# Patient Record
Sex: MALE | Race: WHITE | Employment: FULL TIME | URBAN - METROPOLITAN AREA
[De-identification: names, ages, dates, MRNs, and addresses within clinical notes are randomized per-mention and may not be internally consistent; named-entity substitution may affect disease eponyms.]

---

## 2017-01-04 ENCOUNTER — TELEPHONE (OUTPATIENT)
Dept: BARIATRICS/WEIGHT MGMT | Age: 32
End: 2017-01-04

## 2017-01-04 ENCOUNTER — TELEPHONE (OUTPATIENT)
Dept: FAMILY MEDICINE CLINIC | Age: 32
End: 2017-01-04

## 2017-01-05 ENCOUNTER — OFFICE VISIT (OUTPATIENT)
Dept: BARIATRICS/WEIGHT MGMT | Age: 32
End: 2017-01-05

## 2017-01-05 DIAGNOSIS — E66.01 MORBID OBESITY WITH BMI OF 40.0-44.9, ADULT (HCC): Primary | ICD-10-CM

## 2017-01-05 DIAGNOSIS — I10 HYPERTENSION, ESSENTIAL: ICD-10-CM

## 2017-01-05 PROBLEM — R06.83 SNORING: Status: ACTIVE | Noted: 2017-01-05

## 2017-01-05 PROCEDURE — 99244 OFF/OP CNSLTJ NEW/EST MOD 40: CPT | Performed by: SURGERY

## 2017-01-06 ENCOUNTER — OFFICE VISIT (OUTPATIENT)
Dept: FAMILY MEDICINE CLINIC | Age: 32
End: 2017-01-06

## 2017-01-06 VITALS
BODY MASS INDEX: 40.43 KG/M2 | SYSTOLIC BLOOD PRESSURE: 136 MMHG | DIASTOLIC BLOOD PRESSURE: 86 MMHG | HEART RATE: 75 BPM | RESPIRATION RATE: 16 BRPM | WEIGHT: 315 LBS | OXYGEN SATURATION: 98 % | HEIGHT: 74 IN

## 2017-01-06 DIAGNOSIS — I10 HYPERTENSION, ESSENTIAL: ICD-10-CM

## 2017-01-06 DIAGNOSIS — H69.83 EUSTACHIAN TUBE DYSFUNCTION, BILATERAL: Primary | ICD-10-CM

## 2017-01-06 PROCEDURE — 99213 OFFICE O/P EST LOW 20 MIN: CPT | Performed by: NURSE PRACTITIONER

## 2017-01-06 RX ORDER — FLUNISOLIDE 0.25 MG/ML
1 SOLUTION NASAL EVERY 12 HOURS
Qty: 25 ML | Refills: 0 | Status: SHIPPED | OUTPATIENT
Start: 2017-01-06 | End: 2019-01-03 | Stop reason: ALTCHOICE

## 2017-01-06 ASSESSMENT — PATIENT HEALTH QUESTIONNAIRE - PHQ9
1. LITTLE INTEREST OR PLEASURE IN DOING THINGS: 0
SUM OF ALL RESPONSES TO PHQ QUESTIONS 1-9: 0
SUM OF ALL RESPONSES TO PHQ9 QUESTIONS 1 & 2: 0
2. FEELING DOWN, DEPRESSED OR HOPELESS: 0

## 2017-01-06 ASSESSMENT — ENCOUNTER SYMPTOMS
BACK PAIN: 0
COUGH: 0
VOMITING: 0
NAUSEA: 0
CHEST TIGHTNESS: 0

## 2017-02-09 ENCOUNTER — OFFICE VISIT (OUTPATIENT)
Dept: BARIATRICS/WEIGHT MGMT | Age: 32
End: 2017-02-09

## 2017-02-09 ENCOUNTER — HOSPITAL ENCOUNTER (OUTPATIENT)
Dept: ULTRASOUND IMAGING | Age: 32
Discharge: HOME | End: 2017-02-09
Attending: SURGERY | Admitting: SURGERY

## 2017-02-09 VITALS
HEART RATE: 100 BPM | BODY MASS INDEX: 44.1 KG/M2 | RESPIRATION RATE: 16 BRPM | HEIGHT: 71 IN | DIASTOLIC BLOOD PRESSURE: 84 MMHG | WEIGHT: 315 LBS | SYSTOLIC BLOOD PRESSURE: 121 MMHG

## 2017-02-09 VITALS
BODY MASS INDEX: 44.1 KG/M2 | HEART RATE: 98 BPM | WEIGHT: 315 LBS | SYSTOLIC BLOOD PRESSURE: 135 MMHG | DIASTOLIC BLOOD PRESSURE: 89 MMHG | HEIGHT: 71 IN

## 2017-02-09 DIAGNOSIS — R06.83 SNORING: ICD-10-CM

## 2017-02-09 DIAGNOSIS — E66.01 MORBID OBESITY WITH BMI OF 40.0-44.9, ADULT (HCC): ICD-10-CM

## 2017-02-09 DIAGNOSIS — K76.0 FATTY LIVER: ICD-10-CM

## 2017-02-09 DIAGNOSIS — I10 HYPERTENSION, ESSENTIAL: ICD-10-CM

## 2017-02-09 DIAGNOSIS — F17.200 TOBACCO DEPENDENCE: ICD-10-CM

## 2017-02-09 DIAGNOSIS — E66.01 MORBID OBESITY WITH BMI OF 45.0-49.9, ADULT (HCC): Primary | ICD-10-CM

## 2017-02-09 DIAGNOSIS — E66.01 MORBID (SEVERE) OBESITY DUE TO EXCESS CALORIES (HCC): ICD-10-CM

## 2017-02-09 LAB
A/G RATIO: 1.4 (ref 1.1–2.2)
ALBUMIN SERPL-MCNC: 4.2 G/DL (ref 3.4–5)
ALP BLD-CCNC: 54 U/L (ref 40–129)
ALT SERPL-CCNC: 30 U/L (ref 10–40)
ANION GAP SERPL CALCULATED.3IONS-SCNC: 15 MMOL/L (ref 3–16)
AST SERPL-CCNC: 20 U/L (ref 15–37)
BASOPHILS ABSOLUTE: 0.1 K/UL (ref 0–0.2)
BASOPHILS RELATIVE PERCENT: 0.9 %
BILIRUB SERPL-MCNC: 0.7 MG/DL (ref 0–1)
BUN BLDV-MCNC: 17 MG/DL (ref 7–20)
CALCIUM SERPL-MCNC: 9.8 MG/DL (ref 8.3–10.6)
CHLORIDE BLD-SCNC: 100 MMOL/L (ref 99–110)
CHOLESTEROL, TOTAL: 190 MG/DL (ref 0–199)
CO2: 26 MMOL/L (ref 21–32)
CREAT SERPL-MCNC: 0.8 MG/DL (ref 0.9–1.3)
EOSINOPHILS ABSOLUTE: 0.2 K/UL (ref 0–0.6)
EOSINOPHILS RELATIVE PERCENT: 2.2 %
FOLATE: 6 NG/ML (ref 4.78–24.2)
GFR AFRICAN AMERICAN: >60
GFR NON-AFRICAN AMERICAN: >60
GLOBULIN: 3.1 G/DL
GLUCOSE BLD-MCNC: 113 MG/DL (ref 70–99)
HCT VFR BLD CALC: 46.7 % (ref 40.5–52.5)
HDLC SERPL-MCNC: 34 MG/DL (ref 40–60)
HEMOGLOBIN: 15.7 G/DL (ref 13.5–17.5)
IRON SATURATION: 25 % (ref 20–50)
IRON: 80 UG/DL (ref 59–158)
LDL CHOLESTEROL CALCULATED: ABNORMAL MG/DL
LDL CHOLESTEROL DIRECT: 103 MG/DL
LYMPHOCYTES ABSOLUTE: 2 K/UL (ref 1–5.1)
LYMPHOCYTES RELATIVE PERCENT: 24.5 %
MCH RBC QN AUTO: 28.9 PG (ref 26–34)
MCHC RBC AUTO-ENTMCNC: 33.7 G/DL (ref 31–36)
MCV RBC AUTO: 85.8 FL (ref 80–100)
MONOCYTES ABSOLUTE: 0.7 K/UL (ref 0–1.3)
MONOCYTES RELATIVE PERCENT: 9.1 %
NEUTROPHILS ABSOLUTE: 5.1 K/UL (ref 1.7–7.7)
NEUTROPHILS RELATIVE PERCENT: 63.3 %
PDW BLD-RTO: 13.5 % (ref 12.4–15.4)
PLATELET # BLD: 220 K/UL (ref 135–450)
PMV BLD AUTO: 8 FL (ref 5–10.5)
POTASSIUM SERPL-SCNC: 4.1 MMOL/L (ref 3.5–5.1)
RBC # BLD: 5.44 M/UL (ref 4.2–5.9)
SODIUM BLD-SCNC: 141 MMOL/L (ref 136–145)
TOTAL IRON BINDING CAPACITY: 323 UG/DL (ref 260–445)
TOTAL PROTEIN: 7.3 G/DL (ref 6.4–8.2)
TRIGL SERPL-MCNC: 433 MG/DL (ref 0–150)
TSH REFLEX: 1.68 UIU/ML (ref 0.27–4.2)
VITAMIN B-12: 398 PG/ML (ref 211–911)
VITAMIN D 25-HYDROXY: 17.4 NG/ML
VLDLC SERPL CALC-MCNC: ABNORMAL MG/DL
WBC # BLD: 8.1 K/UL (ref 4–11)

## 2017-02-09 PROCEDURE — 99213 OFFICE O/P EST LOW 20 MIN: CPT | Performed by: SURGERY

## 2017-02-09 ASSESSMENT — ENCOUNTER SYMPTOMS
RESPIRATORY NEGATIVE: 1
GASTROINTESTINAL NEGATIVE: 1

## 2017-02-10 ENCOUNTER — TELEPHONE (OUTPATIENT)
Dept: BARIATRICS/WEIGHT MGMT | Age: 32
End: 2017-02-10

## 2017-02-10 DIAGNOSIS — E78.2 MIXED HYPERLIPIDEMIA: ICD-10-CM

## 2017-02-10 DIAGNOSIS — E55.9 VITAMIN D DEFICIENCY: ICD-10-CM

## 2017-02-10 DIAGNOSIS — R73.01 IMPAIRED FASTING GLUCOSE: Primary | ICD-10-CM

## 2017-02-10 LAB
ESTIMATED AVERAGE GLUCOSE: 111.2 MG/DL
HBA1C MFR BLD: 5.5 %

## 2017-02-10 RX ORDER — ERGOCALCIFEROL 1.25 MG/1
50000 CAPSULE ORAL WEEKLY
Qty: 4 CAPSULE | Refills: 2 | Status: SHIPPED | OUTPATIENT
Start: 2017-02-10 | End: 2017-08-10 | Stop reason: ALTCHOICE

## 2017-02-11 LAB — H PYLORI ANTIGEN STOOL: NEGATIVE

## 2017-03-09 ENCOUNTER — OFFICE VISIT (OUTPATIENT)
Dept: BARIATRICS/WEIGHT MGMT | Age: 32
End: 2017-03-09

## 2017-03-09 VITALS
HEIGHT: 71 IN | HEART RATE: 100 BPM | SYSTOLIC BLOOD PRESSURE: 128 MMHG | DIASTOLIC BLOOD PRESSURE: 89 MMHG | WEIGHT: 315 LBS | BODY MASS INDEX: 44.1 KG/M2

## 2017-03-09 DIAGNOSIS — E78.2 MIXED HYPERLIPIDEMIA: ICD-10-CM

## 2017-03-09 DIAGNOSIS — K76.0 FATTY LIVER: ICD-10-CM

## 2017-03-09 DIAGNOSIS — I10 HYPERTENSION, ESSENTIAL: ICD-10-CM

## 2017-03-09 DIAGNOSIS — E66.01 MORBID OBESITY WITH BMI OF 45.0-49.9, ADULT (HCC): Primary | ICD-10-CM

## 2017-03-09 PROCEDURE — 99213 OFFICE O/P EST LOW 20 MIN: CPT | Performed by: SURGERY

## 2017-04-07 ENCOUNTER — TELEPHONE (OUTPATIENT)
Dept: BARIATRICS/WEIGHT MGMT | Age: 32
End: 2017-04-07

## 2017-04-11 ENCOUNTER — INITIAL CONSULT (OUTPATIENT)
Dept: BARIATRICS/WEIGHT MGMT | Age: 32
End: 2017-04-11

## 2017-04-11 DIAGNOSIS — E66.01 MORBID OBESITY WITH BMI OF 45.0-49.9, ADULT (HCC): ICD-10-CM

## 2017-04-11 DIAGNOSIS — F43.21 ADJUSTMENT DISORDER WITH DEPRESSED MOOD: Primary | ICD-10-CM

## 2017-04-11 PROCEDURE — 96101 PR PSYCHOLOGIC TESTING BY PSYCH/PHYS: CPT | Performed by: PSYCHOLOGIST

## 2017-04-11 PROCEDURE — 90791 PSYCH DIAGNOSTIC EVALUATION: CPT | Performed by: PSYCHOLOGIST

## 2017-04-13 ENCOUNTER — OFFICE VISIT (OUTPATIENT)
Dept: BARIATRICS/WEIGHT MGMT | Age: 32
End: 2017-04-13

## 2017-04-13 VITALS
BODY MASS INDEX: 44.1 KG/M2 | DIASTOLIC BLOOD PRESSURE: 99 MMHG | SYSTOLIC BLOOD PRESSURE: 145 MMHG | HEIGHT: 71 IN | WEIGHT: 315 LBS | HEART RATE: 79 BPM

## 2017-04-13 DIAGNOSIS — I10 HYPERTENSION, ESSENTIAL: ICD-10-CM

## 2017-04-13 DIAGNOSIS — E66.01 MORBID OBESITY WITH BMI OF 45.0-49.9, ADULT (HCC): Primary | ICD-10-CM

## 2017-04-13 DIAGNOSIS — E78.2 MIXED HYPERLIPIDEMIA: ICD-10-CM

## 2017-04-13 DIAGNOSIS — K76.0 FATTY LIVER: ICD-10-CM

## 2017-04-13 PROCEDURE — 99213 OFFICE O/P EST LOW 20 MIN: CPT | Performed by: SURGERY

## 2017-05-11 ENCOUNTER — OFFICE VISIT (OUTPATIENT)
Dept: BARIATRICS/WEIGHT MGMT | Age: 32
End: 2017-05-11

## 2017-05-11 VITALS — WEIGHT: 315 LBS | RESPIRATION RATE: 16 BRPM | HEIGHT: 71 IN | BODY MASS INDEX: 44.1 KG/M2

## 2017-05-11 DIAGNOSIS — I10 HYPERTENSION, ESSENTIAL: ICD-10-CM

## 2017-05-11 DIAGNOSIS — E78.2 MIXED HYPERLIPIDEMIA: ICD-10-CM

## 2017-05-11 DIAGNOSIS — K76.0 FATTY LIVER: ICD-10-CM

## 2017-05-11 DIAGNOSIS — E66.01 MORBID OBESITY WITH BMI OF 45.0-49.9, ADULT (HCC): Primary | ICD-10-CM

## 2017-05-11 PROCEDURE — 99214 OFFICE O/P EST MOD 30 MIN: CPT | Performed by: SURGERY

## 2017-06-26 ENCOUNTER — TELEPHONE (OUTPATIENT)
Dept: BARIATRICS/WEIGHT MGMT | Age: 32
End: 2017-06-26

## 2017-08-10 ENCOUNTER — OFFICE VISIT (OUTPATIENT)
Dept: FAMILY MEDICINE CLINIC | Age: 32
End: 2017-08-10

## 2017-08-10 VITALS
DIASTOLIC BLOOD PRESSURE: 90 MMHG | BODY MASS INDEX: 44.1 KG/M2 | HEART RATE: 90 BPM | WEIGHT: 315 LBS | SYSTOLIC BLOOD PRESSURE: 135 MMHG | HEIGHT: 71 IN | TEMPERATURE: 97.8 F | OXYGEN SATURATION: 98 %

## 2017-08-10 DIAGNOSIS — J20.9 BRONCHITIS, ACUTE, WITH BRONCHOSPASM: Primary | ICD-10-CM

## 2017-08-10 DIAGNOSIS — H69.80 ETD (EUSTACHIAN TUBE DYSFUNCTION), UNSPECIFIED LATERALITY: ICD-10-CM

## 2017-08-10 PROCEDURE — 99213 OFFICE O/P EST LOW 20 MIN: CPT | Performed by: FAMILY MEDICINE

## 2017-08-10 RX ORDER — METHYLPREDNISOLONE 4 MG/1
TABLET ORAL
Qty: 21 TABLET | Refills: 0 | Status: SHIPPED | OUTPATIENT
Start: 2017-08-10 | End: 2017-08-16

## 2017-08-10 RX ORDER — AZITHROMYCIN 250 MG/1
TABLET, FILM COATED ORAL
Qty: 6 TABLET | Refills: 0 | Status: SHIPPED | OUTPATIENT
Start: 2017-08-10 | End: 2017-08-20

## 2017-08-10 ASSESSMENT — ENCOUNTER SYMPTOMS
COUGH: 1
GASTROINTESTINAL NEGATIVE: 1
SINUS PRESSURE: 1
CHEST TIGHTNESS: 1

## 2017-08-29 ENCOUNTER — OFFICE VISIT (OUTPATIENT)
Dept: FAMILY MEDICINE CLINIC | Age: 32
End: 2017-08-29

## 2017-08-29 VITALS
OXYGEN SATURATION: 97 % | DIASTOLIC BLOOD PRESSURE: 80 MMHG | WEIGHT: 315 LBS | HEIGHT: 71 IN | HEART RATE: 85 BPM | BODY MASS INDEX: 44.1 KG/M2 | SYSTOLIC BLOOD PRESSURE: 120 MMHG

## 2017-08-29 DIAGNOSIS — S69.91XA FINGERNAIL INJURY, RIGHT, INITIAL ENCOUNTER: Primary | ICD-10-CM

## 2017-08-29 DIAGNOSIS — S60.949A: ICD-10-CM

## 2017-08-29 DIAGNOSIS — L08.9: ICD-10-CM

## 2017-08-29 DIAGNOSIS — Z23 NEED FOR INFLUENZA VACCINATION: ICD-10-CM

## 2017-08-29 PROCEDURE — 99213 OFFICE O/P EST LOW 20 MIN: CPT | Performed by: FAMILY MEDICINE

## 2017-08-29 PROCEDURE — 90471 IMMUNIZATION ADMIN: CPT | Performed by: FAMILY MEDICINE

## 2017-08-29 PROCEDURE — 90686 IIV4 VACC NO PRSV 0.5 ML IM: CPT | Performed by: FAMILY MEDICINE

## 2017-08-29 RX ORDER — CIPROFLOXACIN 500 MG/1
500 TABLET, FILM COATED ORAL 2 TIMES DAILY
Qty: 28 TABLET | Refills: 0 | Status: SHIPPED | OUTPATIENT
Start: 2017-08-29 | End: 2017-09-12

## 2018-01-10 ENCOUNTER — OFFICE VISIT (OUTPATIENT)
Dept: FAMILY MEDICINE CLINIC | Age: 33
End: 2018-01-10

## 2018-01-10 VITALS
OXYGEN SATURATION: 98 % | SYSTOLIC BLOOD PRESSURE: 150 MMHG | DIASTOLIC BLOOD PRESSURE: 86 MMHG | BODY MASS INDEX: 48.54 KG/M2 | WEIGHT: 315 LBS | TEMPERATURE: 98.6 F | HEART RATE: 99 BPM

## 2018-01-10 DIAGNOSIS — J45.21 MILD INTERMITTENT REACTIVE AIRWAY DISEASE WITH ACUTE EXACERBATION: ICD-10-CM

## 2018-01-10 DIAGNOSIS — I10 ESSENTIAL HYPERTENSION: ICD-10-CM

## 2018-01-10 DIAGNOSIS — J40 BRONCHITIS: Primary | ICD-10-CM

## 2018-01-10 PROCEDURE — 99214 OFFICE O/P EST MOD 30 MIN: CPT | Performed by: FAMILY MEDICINE

## 2018-01-10 RX ORDER — ALBUTEROL SULFATE 90 UG/1
2 AEROSOL, METERED RESPIRATORY (INHALATION) EVERY 6 HOURS PRN
Qty: 1 INHALER | Refills: 3 | Status: SHIPPED | OUTPATIENT
Start: 2018-01-10 | End: 2019-01-03 | Stop reason: ALTCHOICE

## 2018-01-10 RX ORDER — PREDNISONE 20 MG/1
TABLET ORAL
Qty: 10 TABLET | Refills: 0 | Status: SHIPPED | OUTPATIENT
Start: 2018-01-10 | End: 2018-01-20

## 2018-01-10 RX ORDER — DOXYCYCLINE HYCLATE 100 MG
100 TABLET ORAL 2 TIMES DAILY
Qty: 20 TABLET | Refills: 0 | Status: SHIPPED | OUTPATIENT
Start: 2018-01-10 | End: 2018-01-20

## 2018-01-16 ENCOUNTER — TELEPHONE (OUTPATIENT)
Dept: FAMILY MEDICINE CLINIC | Age: 33
End: 2018-01-16

## 2018-01-16 RX ORDER — METHYLPREDNISOLONE 4 MG/1
TABLET ORAL
Qty: 1 KIT | Refills: 0 | Status: SHIPPED | OUTPATIENT
Start: 2018-01-16 | End: 2019-01-03 | Stop reason: ALTCHOICE

## 2018-01-16 NOTE — TELEPHONE ENCOUNTER
Pt was given AB, steroids, and inhaler on 01/10/2018 by Dr. Jose Beltran. Pt states he has seen some improvement, maybe 50%. Thinks he should have seen more improvement by now. Going on vacation in 3 days, going snowmobiling in [facility]). Worried about the weather with being sick.

## 2018-05-02 ENCOUNTER — TELEPHONE (OUTPATIENT)
Dept: FAMILY MEDICINE CLINIC | Age: 33
End: 2018-05-02

## 2018-05-02 RX ORDER — CYCLOBENZAPRINE HCL 10 MG
10 TABLET ORAL 3 TIMES DAILY PRN
Qty: 30 TABLET | Refills: 0 | Status: SHIPPED | OUTPATIENT
Start: 2018-05-02 | End: 2018-05-12

## 2018-05-02 RX ORDER — NAPROXEN 500 MG/1
500 TABLET ORAL 2 TIMES DAILY WITH MEALS
Qty: 20 TABLET | Refills: 0 | Status: SHIPPED | OUTPATIENT
Start: 2018-05-02 | End: 2019-01-03 | Stop reason: ALTCHOICE

## 2018-11-14 ENCOUNTER — TELEPHONE (OUTPATIENT)
Dept: BARIATRICS/WEIGHT MGMT | Age: 33
End: 2018-11-14

## 2018-12-06 ENCOUNTER — TELEPHONE (OUTPATIENT)
Dept: BARIATRICS/WEIGHT MGMT | Age: 33
End: 2018-12-06

## 2018-12-14 RX ORDER — VARENICLINE TARTRATE 25 MG
KIT ORAL
Qty: 1 EACH | Refills: 0 | Status: SHIPPED | OUTPATIENT
Start: 2018-12-14 | End: 2020-07-04

## 2018-12-20 ENCOUNTER — TELEPHONE (OUTPATIENT)
Dept: BARIATRICS/WEIGHT MGMT | Age: 33
End: 2018-12-20

## 2018-12-20 ENCOUNTER — OFFICE VISIT (OUTPATIENT)
Dept: BARIATRICS/WEIGHT MGMT | Age: 33
End: 2018-12-20
Payer: COMMERCIAL

## 2018-12-20 VITALS
BODY MASS INDEX: 44.1 KG/M2 | DIASTOLIC BLOOD PRESSURE: 80 MMHG | SYSTOLIC BLOOD PRESSURE: 140 MMHG | WEIGHT: 315 LBS | HEART RATE: 89 BPM | HEIGHT: 71 IN

## 2018-12-20 DIAGNOSIS — R73.01 IMPAIRED FASTING GLUCOSE: ICD-10-CM

## 2018-12-20 DIAGNOSIS — Z01.818 PRE-OPERATIVE CLEARANCE: ICD-10-CM

## 2018-12-20 DIAGNOSIS — E66.01 MORBID OBESITY WITH BMI OF 45.0-49.9, ADULT (HCC): Primary | ICD-10-CM

## 2018-12-20 DIAGNOSIS — K76.0 FATTY LIVER: ICD-10-CM

## 2018-12-20 DIAGNOSIS — R06.83 SNORING: ICD-10-CM

## 2018-12-20 PROCEDURE — 99214 OFFICE O/P EST MOD 30 MIN: CPT | Performed by: SURGERY

## 2018-12-20 NOTE — PROGRESS NOTES
11.25\" (1.81 m)        Body mass index is 47.92 kg/m².     Lab Results   Component Value Date    WBC 8.1 02/09/2017    RBC 5.44 02/09/2017    HGB 15.7 02/09/2017    HCT 46.7 02/09/2017    MCV 85.8 02/09/2017    MCH 28.9 02/09/2017    MCHC 33.7 02/09/2017    MPV 8.0 02/09/2017    NEUTOPHILPCT 63.3 02/09/2017    LYMPHOPCT 24.5 02/09/2017    MONOPCT 9.1 02/09/2017    EOSRELPCT 2.2 02/09/2017    BASOPCT 0.9 02/09/2017    NEUTROABS 5.1 02/09/2017    LYMPHSABS 2.0 02/09/2017    MONOSABS 0.7 02/09/2017    EOSABS 0.2 02/09/2017     Lab Results   Component Value Date     02/09/2017    K 4.1 02/09/2017     02/09/2017    CO2 26 02/09/2017    ANIONGAP 15 02/09/2017    GLUCOSE 113 02/09/2017    BUN 17 02/09/2017    CREATININE 0.8 02/09/2017    LABGLOM >60 02/09/2017    GFRAA >60 02/09/2017    GFRAA >60 01/07/2011    CALCIUM 9.8 02/09/2017    PROT 7.3 02/09/2017    PROT 7.4 01/07/2011    LABALBU 4.2 02/09/2017    AGRATIO 1.4 02/09/2017    BILITOT 0.7 02/09/2017    ALKPHOS 54 02/09/2017    ALT 30 02/09/2017    AST 20 02/09/2017    GLOB 3.1 02/09/2017     Lab Results   Component Value Date    CHOL 190 02/09/2017    TRIG 433 02/09/2017    HDL 34 02/09/2017    HDL 33 01/07/2011    LDLCALC see below 02/09/2017    LABVLDL see below 02/09/2017     Lab Results   Component Value Date    TSHREFLEX 1.68 02/09/2017     Lab Results   Component Value Date    IRON 80 02/09/2017    TIBC 323 02/09/2017    LABIRON 25 02/09/2017     Lab Results   Component Value Date    KRAMXDYP88 398 02/09/2017    FOLATE 6.00 02/09/2017     Lab Results   Component Value Date    VITD25 17.4 02/09/2017     Lab Results   Component Value Date    LABA1C 5.5 02/09/2017    .2 02/09/2017         Current Outpatient Prescriptions:     varenicline (CHANTIX STARTING MONTH PAK) 0.5 MG X 11 & 1 MG X 42 tablet, Take by mouth as directed, Disp: 1 each, Rfl: 0    naproxen (NAPROSYN) 500 MG tablet, Take 1 tablet by mouth 2 times daily (with meals) for 10 days, has not been here for 19 months, I had advised him last time he was seen that he need to show more committment to the process and more importantly to his health by starting to make healthy lifestyle changes. Since then he has been , has been eating at home more, feels his life is more organized now and can focus on his health. I advised him that we gave him last chance to pursue surgery , but he has to show us that he is really committed and compliant with our recommendations , as this will lead to safe,  successful and sustainable weight loss. I did explain thoroughly to the patient that compliance with pre- and post op diet and other recommendations are integral part to improve the chances of successful weight loss and also not following it could end with serious health complications. Some strategies discussed today like 30/30/30 minutes rule, food diary, avoid fast food and packing/planing ahead,  increasing exercise. ..etc.        Labs ordered. Psych Evaluation. Sleep Study & CPAP Compliance. H-pylori (testing for bacteria in the stomach). Support Group. PCP Letter. Quit Smoking,  Alcohol, Caffeine and Carbonated Drinks  Weight History 2 yrs. F/U in 4 weeks. Patient advised that its their responsibility to follow up for studies, referrals and/or labs ordered today.

## 2018-12-20 NOTE — PROGRESS NOTES
Porfirio Whittington gained 16.3 lbs over 19 months. Pt is still skipping meals. Grazes through the day, works from home. Breakfast: none    Snack: deli turkey OR a few spoons of chicken / tuna salad    Lunch: a few sausage links     Snack: a few slices of turkey and cheese     Dinner: beef stew and crackers and veggies OR mata pasta with sauce and meat OR salmon and broccoli    Snack: poptarts and glass of milk    Fluids: coffee with hazelnut creamer, water, soda 8 per week, milk 1%, powerade zero     Is pt consuming smaller portions? no - has not actively trying to eat smaller portions    Is pt consuming at least 64 oz of fluids per day? no    Is pt consuming carbonated, caffeinated, or sugary beverages? yes coffee and soda 50 oz coffee a day and 10 oz creamer     Has pt sampled Unjury and/or Nectar protein? 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

## 2019-01-03 ENCOUNTER — OFFICE VISIT (OUTPATIENT)
Dept: FAMILY MEDICINE CLINIC | Age: 34
End: 2019-01-03
Payer: COMMERCIAL

## 2019-01-03 VITALS
BODY MASS INDEX: 44.1 KG/M2 | OXYGEN SATURATION: 96 % | WEIGHT: 315 LBS | HEIGHT: 71 IN | DIASTOLIC BLOOD PRESSURE: 80 MMHG | HEART RATE: 85 BPM | SYSTOLIC BLOOD PRESSURE: 126 MMHG

## 2019-01-03 DIAGNOSIS — F17.210 CIGARETTE NICOTINE DEPENDENCE WITHOUT COMPLICATION: Primary | ICD-10-CM

## 2019-01-03 PROCEDURE — 99213 OFFICE O/P EST LOW 20 MIN: CPT | Performed by: FAMILY MEDICINE

## 2019-01-03 RX ORDER — VARENICLINE TARTRATE 1 MG/1
1 TABLET, FILM COATED ORAL 2 TIMES DAILY
Qty: 60 TABLET | Refills: 1 | Status: SHIPPED | OUTPATIENT
Start: 2019-01-14 | End: 2019-03-15

## 2019-01-03 RX ORDER — VARENICLINE TARTRATE 1 MG/1
1 TABLET, FILM COATED ORAL 2 TIMES DAILY
Qty: 60 TABLET | Refills: 3 | Status: CANCELLED | OUTPATIENT
Start: 2019-01-03

## 2019-01-03 ASSESSMENT — PATIENT HEALTH QUESTIONNAIRE - PHQ9
SUM OF ALL RESPONSES TO PHQ QUESTIONS 1-9: 0
SUM OF ALL RESPONSES TO PHQ QUESTIONS 1-9: 0
1. LITTLE INTEREST OR PLEASURE IN DOING THINGS: 0
SUM OF ALL RESPONSES TO PHQ9 QUESTIONS 1 & 2: 0
2. FEELING DOWN, DEPRESSED OR HOPELESS: 0

## 2019-01-04 ASSESSMENT — ENCOUNTER SYMPTOMS
SHORTNESS OF BREATH: 0
COUGH: 0

## 2019-01-17 ENCOUNTER — OFFICE VISIT (OUTPATIENT)
Dept: BARIATRICS/WEIGHT MGMT | Age: 34
End: 2019-01-17
Payer: COMMERCIAL

## 2019-01-17 VITALS
RESPIRATION RATE: 18 BRPM | BODY MASS INDEX: 44.1 KG/M2 | HEIGHT: 71 IN | OXYGEN SATURATION: 98 % | SYSTOLIC BLOOD PRESSURE: 134 MMHG | WEIGHT: 315 LBS | DIASTOLIC BLOOD PRESSURE: 94 MMHG | HEART RATE: 92 BPM

## 2019-01-17 DIAGNOSIS — E66.01 MORBID OBESITY WITH BMI OF 45.0-49.9, ADULT (HCC): Primary | ICD-10-CM

## 2019-01-17 DIAGNOSIS — K76.0 FATTY LIVER: ICD-10-CM

## 2019-01-17 PROCEDURE — 99214 OFFICE O/P EST MOD 30 MIN: CPT | Performed by: SURGERY

## 2019-01-19 PROBLEM — Z01.818 PRE-OPERATIVE CLEARANCE: Status: RESOLVED | Noted: 2018-12-20 | Resolved: 2019-01-19

## 2019-02-04 ENCOUNTER — OFFICE VISIT (OUTPATIENT)
Dept: PULMONOLOGY | Age: 34
End: 2019-02-04
Payer: COMMERCIAL

## 2019-02-04 VITALS
DIASTOLIC BLOOD PRESSURE: 84 MMHG | TEMPERATURE: 97.4 F | BODY MASS INDEX: 44.1 KG/M2 | HEART RATE: 84 BPM | SYSTOLIC BLOOD PRESSURE: 138 MMHG | OXYGEN SATURATION: 96 % | WEIGHT: 315 LBS | HEIGHT: 71 IN | RESPIRATION RATE: 16 BRPM

## 2019-02-04 DIAGNOSIS — R06.83 SNORING: ICD-10-CM

## 2019-02-04 DIAGNOSIS — Z01.818 PRE-OPERATIVE CLEARANCE: ICD-10-CM

## 2019-02-04 DIAGNOSIS — E66.01 MORBID OBESITY (HCC): ICD-10-CM

## 2019-02-04 DIAGNOSIS — Z87.891 PERSONAL HISTORY OF TOBACCO USE: ICD-10-CM

## 2019-02-04 DIAGNOSIS — R53.83 FATIGUE, UNSPECIFIED TYPE: ICD-10-CM

## 2019-02-04 DIAGNOSIS — G47.10 HYPERSOMNIA: Primary | ICD-10-CM

## 2019-02-04 PROCEDURE — 99244 OFF/OP CNSLTJ NEW/EST MOD 40: CPT | Performed by: INTERNAL MEDICINE

## 2019-02-04 RX ORDER — ALBUTEROL SULFATE 90 UG/1
2 AEROSOL, METERED RESPIRATORY (INHALATION) EVERY 6 HOURS PRN
Qty: 1 INHALER | Refills: 6 | Status: SHIPPED | OUTPATIENT
Start: 2019-02-04 | End: 2020-07-04 | Stop reason: ALTCHOICE

## 2019-02-04 ASSESSMENT — SLEEP AND FATIGUE QUESTIONNAIRES
HOW LIKELY ARE YOU TO NOD OFF OR FALL ASLEEP WHEN YOU ARE A PASSENGER IN A CAR FOR AN HOUR WITHOUT A BREAK: 2
HOW LIKELY ARE YOU TO NOD OFF OR FALL ASLEEP IN A CAR, WHILE STOPPED FOR A FEW MINUTES IN TRAFFIC: 0
HOW LIKELY ARE YOU TO NOD OFF OR FALL ASLEEP WHILE LYING DOWN TO REST IN THE AFTERNOON WHEN CIRCUMSTANCES PERMIT: 2
HOW LIKELY ARE YOU TO NOD OFF OR FALL ASLEEP WHILE SITTING QUIETLY AFTER LUNCH WITHOUT ALCOHOL: 2
ESS TOTAL SCORE: 11
HOW LIKELY ARE YOU TO NOD OFF OR FALL ASLEEP WHILE SITTING INACTIVE IN A PUBLIC PLACE: 2
HOW LIKELY ARE YOU TO NOD OFF OR FALL ASLEEP WHILE SITTING AND READING: 3
HOW LIKELY ARE YOU TO NOD OFF OR FALL ASLEEP WHILE SITTING AND TALKING TO SOMEONE: 0
HOW LIKELY ARE YOU TO NOD OFF OR FALL ASLEEP WHILE WATCHING TV: 0
NECK CIRCUMFERENCE (INCHES): 19

## 2019-02-19 ENCOUNTER — HOSPITAL ENCOUNTER (OUTPATIENT)
Age: 34
Discharge: HOME | End: 2019-02-19
Payer: COMMERCIAL

## 2019-02-19 ENCOUNTER — HOSPITAL ENCOUNTER (OUTPATIENT)
Dept: PULMONOLOGY | Age: 34
Discharge: HOME | End: 2019-02-19
Payer: COMMERCIAL

## 2019-02-19 ENCOUNTER — HOSPITAL ENCOUNTER (OUTPATIENT)
Dept: GENERAL RADIOLOGY | Age: 34
Discharge: HOME | End: 2019-02-19
Payer: COMMERCIAL

## 2019-02-19 ENCOUNTER — HOSPITAL ENCOUNTER (OUTPATIENT)
Dept: SLEEP CENTER | Age: 34
Discharge: HOME | End: 2019-02-21
Payer: COMMERCIAL

## 2019-02-19 DIAGNOSIS — E66.01 MORBID OBESITY WITH BMI OF 45.0-49.9, ADULT (HCC): ICD-10-CM

## 2019-02-19 DIAGNOSIS — R73.01 IMPAIRED FASTING GLUCOSE: ICD-10-CM

## 2019-02-19 DIAGNOSIS — Z01.818 PRE-OPERATIVE CLEARANCE: ICD-10-CM

## 2019-02-19 DIAGNOSIS — K76.0 FATTY LIVER: ICD-10-CM

## 2019-02-19 DIAGNOSIS — R06.83 SNORING: ICD-10-CM

## 2019-02-19 DIAGNOSIS — E66.01 MORBID OBESITY (HCC): ICD-10-CM

## 2019-02-19 DIAGNOSIS — R53.83 FATIGUE, UNSPECIFIED TYPE: ICD-10-CM

## 2019-02-19 DIAGNOSIS — G47.10 HYPERSOMNIA: ICD-10-CM

## 2019-02-19 LAB
A/G RATIO: 1.4 (ref 1.1–2.2)
ALBUMIN SERPL-MCNC: 4.3 G/DL (ref 3.4–5)
ALP BLD-CCNC: 59 U/L (ref 40–129)
ALT SERPL-CCNC: 23 U/L (ref 10–40)
ANION GAP SERPL CALCULATED.3IONS-SCNC: 14 MMOL/L (ref 3–16)
AST SERPL-CCNC: 17 U/L (ref 15–37)
BASOPHILS ABSOLUTE: 0 K/UL (ref 0–0.2)
BASOPHILS RELATIVE PERCENT: 0.5 %
BILIRUB SERPL-MCNC: 0.5 MG/DL (ref 0–1)
BUN BLDV-MCNC: 19 MG/DL (ref 7–20)
CALCIUM SERPL-MCNC: 9.2 MG/DL (ref 8.3–10.6)
CHLORIDE BLD-SCNC: 102 MMOL/L (ref 99–110)
CHOLESTEROL, TOTAL: 172 MG/DL (ref 0–199)
CO2: 23 MMOL/L (ref 21–32)
CREAT SERPL-MCNC: 0.6 MG/DL (ref 0.9–1.3)
EOSINOPHILS ABSOLUTE: 0.2 K/UL (ref 0–0.6)
EOSINOPHILS RELATIVE PERCENT: 2.3 %
FOLATE: 10.33 NG/ML (ref 4.78–24.2)
GFR AFRICAN AMERICAN: >60
GFR NON-AFRICAN AMERICAN: >60
GLOBULIN: 3.1 G/DL
GLUCOSE BLD-MCNC: 105 MG/DL (ref 70–99)
HCT VFR BLD CALC: 50.4 % (ref 40.5–52.5)
HDLC SERPL-MCNC: 31 MG/DL (ref 40–60)
HEMOGLOBIN: 16.8 G/DL (ref 13.5–17.5)
IRON SATURATION: 27 % (ref 20–50)
IRON: 87 UG/DL (ref 59–158)
LDL CHOLESTEROL CALCULATED: ABNORMAL MG/DL
LDL CHOLESTEROL DIRECT: 94 MG/DL
LYMPHOCYTES ABSOLUTE: 2.3 K/UL (ref 1–5.1)
LYMPHOCYTES RELATIVE PERCENT: 28.6 %
MCH RBC QN AUTO: 28.9 PG (ref 26–34)
MCHC RBC AUTO-ENTMCNC: 33.3 G/DL (ref 31–36)
MCV RBC AUTO: 86.7 FL (ref 80–100)
MONOCYTES ABSOLUTE: 0.6 K/UL (ref 0–1.3)
MONOCYTES RELATIVE PERCENT: 7.1 %
NEUTROPHILS ABSOLUTE: 4.9 K/UL (ref 1.7–7.7)
NEUTROPHILS RELATIVE PERCENT: 61.5 %
PDW BLD-RTO: 13.4 % (ref 12.4–15.4)
PLATELET # BLD: 246 K/UL (ref 135–450)
PMV BLD AUTO: 8.3 FL (ref 5–10.5)
POTASSIUM SERPL-SCNC: 4.4 MMOL/L (ref 3.5–5.1)
RBC # BLD: 5.82 M/UL (ref 4.2–5.9)
SODIUM BLD-SCNC: 139 MMOL/L (ref 136–145)
TOTAL IRON BINDING CAPACITY: 325 UG/DL (ref 260–445)
TOTAL PROTEIN: 7.4 G/DL (ref 6.4–8.2)
TRIGL SERPL-MCNC: 407 MG/DL (ref 0–150)
TSH REFLEX: 1.2 UIU/ML (ref 0.27–4.2)
VITAMIN B-12: 395 PG/ML (ref 211–911)
VITAMIN D 25-HYDROXY: 18.5 NG/ML
VLDLC SERPL CALC-MCNC: ABNORMAL MG/DL
WBC # BLD: 7.9 K/UL (ref 4–11)

## 2019-02-19 PROCEDURE — 94060 EVALUATION OF WHEEZING: CPT

## 2019-02-19 PROCEDURE — 94726 PLETHYSMOGRAPHY LUNG VOLUMES: CPT

## 2019-02-19 PROCEDURE — 84446 ASSAY OF VITAMIN E: CPT

## 2019-02-19 PROCEDURE — 94760 N-INVAS EAR/PLS OXIMETRY 1: CPT

## 2019-02-19 PROCEDURE — 82746 ASSAY OF FOLIC ACID SERUM: CPT

## 2019-02-19 PROCEDURE — 36415 COLL VENOUS BLD VENIPUNCTURE: CPT

## 2019-02-19 PROCEDURE — 83721 ASSAY OF BLOOD LIPOPROTEIN: CPT

## 2019-02-19 PROCEDURE — 85025 COMPLETE CBC W/AUTO DIFF WBC: CPT

## 2019-02-19 PROCEDURE — 84443 ASSAY THYROID STIM HORMONE: CPT

## 2019-02-19 PROCEDURE — 94729 DIFFUSING CAPACITY: CPT

## 2019-02-19 PROCEDURE — 80053 COMPREHEN METABOLIC PANEL: CPT

## 2019-02-19 PROCEDURE — 82607 VITAMIN B-12: CPT

## 2019-02-19 PROCEDURE — 94664 DEMO&/EVAL PT USE INHALER: CPT

## 2019-02-19 PROCEDURE — 6370000000 HC RX 637 (ALT 250 FOR IP): Performed by: INTERNAL MEDICINE

## 2019-02-19 PROCEDURE — 84425 ASSAY OF VITAMIN B-1: CPT

## 2019-02-19 PROCEDURE — 83550 IRON BINDING TEST: CPT

## 2019-02-19 PROCEDURE — 84590 ASSAY OF VITAMIN A: CPT

## 2019-02-19 PROCEDURE — 83540 ASSAY OF IRON: CPT

## 2019-02-19 PROCEDURE — 80061 LIPID PANEL: CPT

## 2019-02-19 PROCEDURE — 84207 ASSAY OF VITAMIN B-6: CPT

## 2019-02-19 PROCEDURE — 82306 VITAMIN D 25 HYDROXY: CPT

## 2019-02-19 PROCEDURE — 95806 SLEEP STUDY UNATT&RESP EFFT: CPT

## 2019-02-19 PROCEDURE — 83036 HEMOGLOBIN GLYCOSYLATED A1C: CPT

## 2019-02-19 PROCEDURE — 71046 X-RAY EXAM CHEST 2 VIEWS: CPT

## 2019-02-19 PROCEDURE — 83013 H PYLORI (C-13) BREATH: CPT

## 2019-02-19 PROCEDURE — 84597 ASSAY OF VITAMIN K: CPT

## 2019-02-19 RX ORDER — ALBUTEROL SULFATE 90 UG/1
4 AEROSOL, METERED RESPIRATORY (INHALATION) ONCE
Status: COMPLETED | OUTPATIENT
Start: 2019-02-19 | End: 2019-02-19

## 2019-02-19 RX ADMIN — Medication 4 PUFF: at 09:21

## 2019-02-20 LAB
ESTIMATED AVERAGE GLUCOSE: 122.6 MG/DL
HBA1C MFR BLD: 5.9 %

## 2019-02-22 LAB
ALPHA-TOCOPHEROL: 14.2 MG/L (ref 5.5–18)
GAMMA-TOCOPHEROL: 3.6 MG/L (ref 0–6)
H PYLORI BREATH TEST: NEGATIVE
RETINYL PALMITATE: 0.04 MG/L (ref 0–0.1)
VITAMIN A LEVEL: 0.7 MG/L (ref 0.3–1.2)
VITAMIN A, INTERP: NORMAL
VITAMIN B1, PLASMA: 10 NMOL/L (ref 4–15)
VITAMIN K1: 2.94 NMOL/L (ref 0.22–4.88)

## 2019-02-23 LAB — VITAMIN B6: 106.7 NMOL/L (ref 20–125)

## 2019-02-28 ENCOUNTER — OFFICE VISIT (OUTPATIENT)
Dept: BARIATRICS/WEIGHT MGMT | Age: 34
End: 2019-02-28
Payer: COMMERCIAL

## 2019-02-28 ENCOUNTER — TELEPHONE (OUTPATIENT)
Dept: FAMILY MEDICINE CLINIC | Age: 34
End: 2019-02-28

## 2019-02-28 VITALS
OXYGEN SATURATION: 98 % | WEIGHT: 315 LBS | DIASTOLIC BLOOD PRESSURE: 70 MMHG | SYSTOLIC BLOOD PRESSURE: 110 MMHG | HEIGHT: 71 IN | HEART RATE: 82 BPM | BODY MASS INDEX: 44.1 KG/M2 | RESPIRATION RATE: 16 BRPM

## 2019-02-28 DIAGNOSIS — E66.01 MORBID OBESITY WITH BMI OF 45.0-49.9, ADULT (HCC): Primary | ICD-10-CM

## 2019-02-28 DIAGNOSIS — R73.03 PREDIABETES: ICD-10-CM

## 2019-02-28 DIAGNOSIS — K76.0 FATTY LIVER: ICD-10-CM

## 2019-02-28 DIAGNOSIS — E55.9 VITAMIN D DEFICIENCY: ICD-10-CM

## 2019-02-28 DIAGNOSIS — E78.2 MIXED HYPERLIPIDEMIA: ICD-10-CM

## 2019-02-28 PROCEDURE — 99213 OFFICE O/P EST LOW 20 MIN: CPT | Performed by: NURSE PRACTITIONER

## 2019-02-28 RX ORDER — M-VIT,TX,IRON,MINS/CALC/FOLIC 27MG-0.4MG
2 TABLET ORAL DAILY
COMMUNITY
End: 2020-07-04

## 2019-02-28 RX ORDER — ERGOCALCIFEROL 1.25 MG/1
50000 CAPSULE ORAL WEEKLY
Qty: 12 CAPSULE | Refills: 0 | Status: SHIPPED | OUTPATIENT
Start: 2019-02-28 | End: 2019-05-17

## 2019-02-28 ASSESSMENT — ENCOUNTER SYMPTOMS
GASTROINTESTINAL NEGATIVE: 1
RESPIRATORY NEGATIVE: 1

## 2019-03-05 ENCOUNTER — TELEPHONE (OUTPATIENT)
Dept: PULMONOLOGY | Age: 34
End: 2019-03-05

## 2019-03-05 ENCOUNTER — OFFICE VISIT (OUTPATIENT)
Dept: PULMONOLOGY | Age: 34
End: 2019-03-05
Payer: COMMERCIAL

## 2019-03-05 VITALS
WEIGHT: 315 LBS | HEART RATE: 82 BPM | OXYGEN SATURATION: 98 % | BODY MASS INDEX: 42.66 KG/M2 | DIASTOLIC BLOOD PRESSURE: 84 MMHG | HEIGHT: 72 IN | TEMPERATURE: 98.9 F | SYSTOLIC BLOOD PRESSURE: 130 MMHG | RESPIRATION RATE: 16 BRPM

## 2019-03-05 DIAGNOSIS — G47.33 OSA (OBSTRUCTIVE SLEEP APNEA): Primary | ICD-10-CM

## 2019-03-05 DIAGNOSIS — E66.01 MORBID OBESITY (HCC): ICD-10-CM

## 2019-03-05 DIAGNOSIS — J98.4 RESTRICTIVE LUNG DISEASE: ICD-10-CM

## 2019-03-05 DIAGNOSIS — Z72.0 TOBACCO ABUSE: ICD-10-CM

## 2019-03-05 PROCEDURE — 99214 OFFICE O/P EST MOD 30 MIN: CPT | Performed by: INTERNAL MEDICINE

## 2019-03-05 ASSESSMENT — SLEEP AND FATIGUE QUESTIONNAIRES
HOW LIKELY ARE YOU TO NOD OFF OR FALL ASLEEP WHILE SITTING QUIETLY AFTER LUNCH WITHOUT ALCOHOL: 2
HOW LIKELY ARE YOU TO NOD OFF OR FALL ASLEEP IN A CAR, WHILE STOPPED FOR A FEW MINUTES IN TRAFFIC: 0
NECK CIRCUMFERENCE (INCHES): 18.5
HOW LIKELY ARE YOU TO NOD OFF OR FALL ASLEEP WHEN YOU ARE A PASSENGER IN A CAR FOR AN HOUR WITHOUT A BREAK: 3
HOW LIKELY ARE YOU TO NOD OFF OR FALL ASLEEP WHILE WATCHING TV: 1
HOW LIKELY ARE YOU TO NOD OFF OR FALL ASLEEP WHILE SITTING AND TALKING TO SOMEONE: 0
HOW LIKELY ARE YOU TO NOD OFF OR FALL ASLEEP WHILE SITTING INACTIVE IN A PUBLIC PLACE: 2
HOW LIKELY ARE YOU TO NOD OFF OR FALL ASLEEP WHILE LYING DOWN TO REST IN THE AFTERNOON WHEN CIRCUMSTANCES PERMIT: 3
HOW LIKELY ARE YOU TO NOD OFF OR FALL ASLEEP WHILE SITTING AND READING: 2
ESS TOTAL SCORE: 13

## 2019-03-12 ENCOUNTER — INITIAL CONSULT (OUTPATIENT)
Dept: BARIATRICS/WEIGHT MGMT | Age: 34
End: 2019-03-12
Payer: COMMERCIAL

## 2019-03-12 DIAGNOSIS — E66.01 MORBID OBESITY WITH BMI OF 45.0-49.9, ADULT (HCC): ICD-10-CM

## 2019-03-12 DIAGNOSIS — F43.22 ADJUSTMENT REACTION WITH ANXIETY: Primary | ICD-10-CM

## 2019-03-12 PROCEDURE — 90791 PSYCH DIAGNOSTIC EVALUATION: CPT | Performed by: PSYCHOLOGIST

## 2019-03-12 NOTE — PROGRESS NOTES
Shayna Judd presented for his updated presurgical psychological evaluation on 03/12/19. The evaluation consisted of a clinical interview, the Eating Habits Checklist, and the [facility] Diagnostic (MBMD). A previous presurgical psychological evaluation of 04/11/17 was also reviewed. Based on the evaluation, Shayna Judd is considered to be an appropriate candidate for bariatric surgery from a psychological standpoint, provided he demonstrates good compliance with presurgical dietary and medical recommendations. He acknowledges mild anxiety secondary to his impending surgery, but otherwise reports no significant emotional distress at present. He reports a history of depression following his divorce six years ago. He was prescribed Wellbutrin XL 450mg by his family physician, which he took with good therapeutic effect for two years before discontinuing the medication due to sexual side effects. He notes the medication facilitated weight loss for him as well. He participated in psychotherapy while going through his divorce, and completed a court-ordered anger management program several years ago as well. He reports no additional history of mental health concerns or psychological intervention. He denies a history of suicidal ideation or suicide attempts, and has never been hospitalized psychiatrically. There is no indication of chemical abuse or dependence, with the exception of nicotine dependence. He states he has smoked intermittently since age 13. He is currently smoking 1.5ppd. He states he has been taking Chantix for two months, with no significant therapeutic benefit. He indicated he plans to discontinue the Chantix and begin exploring other options for smoking cessation. Mr. Avtar Crawley acknowledges drinking alcohol on a regular basis. He reports drinking one drink 1-2 evenings/week, and three or more drinks every 10 days on average.  He indicated he consumes five drinks or more once/month when he travels to [facility] on business. He reports no known family history of alcoholism or chemical dependence. He denies a history of trauma, but acknowledges difficulties in adolescence following his parents' divorce, which ultimately led to him leaving home at 15years old. Judy Garcia has never been diagnosed with an eating disorder, and his responses in the interview and on the Eating Habits Checklist do not warrant a clinical diagnosis. He acknowledges a long history of eating inadequately or infrequently throughout the day, but notes active progress toward eating small, frequent portions more consistently throughout his day, followed by a sensible dinner. He endorsed self-punitive thoughts and feelings related to his weight and/or eating behaviors. He reports binge eating a few times/year, but denies any recent purging behavior. He acknowledges restrictive eating after his divorce that resulted in a loss of 100lbs in a six month period. 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 He notes he used to work  hours/week, but has decreased his work hours significantly in the past year to 35-40 hours/week, some of which he works from home. He currently resides with his wife of one year and his 7 y/o daughter from his previous marriage. Judy Garcia completed the MBMD as part of the evaluation. His profile is valid. Results indicate eating and inactivity are possible problem areas at this time, and smoking and caffeine use are likely problem areas. There is no indication of acute psychiatric distress, including anxiety, depression, emotional lability, or cognitive dysfunction.  He does, however, endorse more guardedness than the typical medical patient, which could adversely affect his willingness and/or ability to establish rapport with his providers and follow medical recommendations. We discussed his alcohol use at length, as he is currently drinking more than what would be recommended or feasible post-surgically. He is aware that beer is carbonated and not recommended at all post-surgically. He is also aware of the potentially elevated risk of alcohol-use disorders after weight loss surgery. He expressed confidence in his ability to monitor/modify his alcohol use accordingly, and in fact, expressed more concerns about his ability to quit smoking than to continue to reduce his alcohol use. Mr. Debbra Felty expressed some reluctance about post-surgical exercise, as his goal is to look thinner and his previous experience with exercise resulted in addition of muscle mass without weight loss or significant change in body shape. Encouraged him to discuss this with the dieticians and his surgeon. We also discussed his natural temperament as being very driven and prone toward an \"all-or-nothing\" approach to new ventures. We discussed the risk of burnout in both his eating behaviors and exercise, and the ways in which to pace his progress to ensure that he maintains long-term lifestyle changes. He was encouraged to participate in support group activities through [facility], and to consult with our staff should he experience any significant post-surgical mood changes or have difficulty modifying his eating behaviors. Feel free to consult with me as needed with any further questions regarding this evaluation.

## 2019-03-14 ENCOUNTER — TELEPHONE (OUTPATIENT)
Dept: BARIATRICS/WEIGHT MGMT | Age: 34
End: 2019-03-14

## 2019-03-20 ENCOUNTER — OFFICE VISIT (OUTPATIENT)
Dept: BARIATRICS/WEIGHT MGMT | Age: 34
End: 2019-03-20
Payer: COMMERCIAL

## 2019-03-20 VITALS
SYSTOLIC BLOOD PRESSURE: 130 MMHG | DIASTOLIC BLOOD PRESSURE: 88 MMHG | RESPIRATION RATE: 16 BRPM | HEIGHT: 71 IN | BODY MASS INDEX: 44.1 KG/M2 | HEART RATE: 86 BPM | WEIGHT: 315 LBS

## 2019-03-20 DIAGNOSIS — E78.2 MIXED HYPERLIPIDEMIA: ICD-10-CM

## 2019-03-20 DIAGNOSIS — R73.03 PREDIABETES: ICD-10-CM

## 2019-03-20 DIAGNOSIS — K76.0 FATTY LIVER: ICD-10-CM

## 2019-03-20 DIAGNOSIS — E66.01 MORBID OBESITY WITH BMI OF 45.0-49.9, ADULT (HCC): Primary | ICD-10-CM

## 2019-03-20 PROCEDURE — 99213 OFFICE O/P EST LOW 20 MIN: CPT | Performed by: NURSE PRACTITIONER

## 2019-03-20 ASSESSMENT — ENCOUNTER SYMPTOMS
RESPIRATORY NEGATIVE: 1
EYES NEGATIVE: 1
ALLERGIC/IMMUNOLOGIC NEGATIVE: 1
GASTROINTESTINAL NEGATIVE: 1

## 2019-04-11 ENCOUNTER — OFFICE VISIT (OUTPATIENT)
Dept: BARIATRICS/WEIGHT MGMT | Age: 34
End: 2019-04-11
Payer: COMMERCIAL

## 2019-04-11 VITALS
HEART RATE: 81 BPM | BODY MASS INDEX: 44.1 KG/M2 | HEIGHT: 71 IN | WEIGHT: 315 LBS | DIASTOLIC BLOOD PRESSURE: 86 MMHG | RESPIRATION RATE: 18 BRPM | OXYGEN SATURATION: 98 % | SYSTOLIC BLOOD PRESSURE: 139 MMHG

## 2019-04-11 DIAGNOSIS — G47.33 OSA ON CPAP: ICD-10-CM

## 2019-04-11 DIAGNOSIS — E78.2 MIXED HYPERLIPIDEMIA: ICD-10-CM

## 2019-04-11 DIAGNOSIS — K76.0 FATTY LIVER: ICD-10-CM

## 2019-04-11 DIAGNOSIS — E66.01 MORBID OBESITY WITH BMI OF 45.0-49.9, ADULT (HCC): Primary | ICD-10-CM

## 2019-04-11 DIAGNOSIS — R73.03 PREDIABETES: ICD-10-CM

## 2019-04-11 DIAGNOSIS — Z99.89 OSA ON CPAP: ICD-10-CM

## 2019-04-11 PROCEDURE — 99213 OFFICE O/P EST LOW 20 MIN: CPT | Performed by: SURGERY

## 2019-04-11 NOTE — PROGRESS NOTES
[facility]) Physicians   [facility]  Eda Cameron MD, [address] [facility], [address] [place] [address]    [place]  [id number] . Phone: [phone]  Fax: [phone]          Chief Complaint   Patient presents with    Obesity     5th presurgery weigh         HPI:     Gil Jung is a very pleasant 35 y.o. male with Body mass index is 47.5 kg/m². / Chronic Obesity. En English has been struggling for several years now with obesity. En English feels the weight is an obstacle to achieve and perform things in daily living as well risk on health. Patient  is very determined to lose weight and be healthy, and is working towards  surgical weight loss to achieve this goal. Pre-operative clearance and work up pending. Working hard to keep good dietary habits as well level of activity. Patient denies any nausea, vomiting, fevers, chills, shortness of breath, chest pain, cough, constipation or difficulty urinating.     Pain Assessment   Denies any abdominal pain       Past Medical History:   Diagnosis Date    Depression     Hypertension     Sleep apnea     no sleep studies     Past Surgical History:   Procedure Laterality Date    HAND SURGERY      LAPAROSCOPIC APPENDECTOMY N/A 13    LAPAROSCOPIC APPENDECTOMY                 TONSILLECTOMY      WISDOM TOOTH EXTRACTION       Family History   Problem Relation Age of Onset    Diabetes Mother     High Blood Pressure Mother     High Cholesterol Mother     Diabetes Father     High Blood Pressure Father     High Cholesterol Father     Heart Disease Brother         aortic stenosis    Cancer Maternal Grandmother     Cancer Maternal Grandfather     Cancer Paternal Grandmother     Cancer Paternal Grandfather      Social History     Tobacco Use    Smoking status: Current Every Day Smoker     Packs/day: 1.00     Years: 11.00     Pack years: 11.00     Types: Cigarettes     Last attempt to quit: 12/15/2016     Years since quittin.3    Smokeless 02/19/2019     Lab Results   Component Value Date    LABA1C 5.9 02/19/2019    .6 02/19/2019         Current Outpatient Medications:     Multiple Vitamins-Minerals (THERAPEUTIC MULTIVITAMIN-MINERALS) tablet, Take 2 tablets by mouth daily, Disp: , Rfl:     vitamin D (ERGOCALCIFEROL) 84475 units CAPS capsule, Take 1 capsule by mouth once a week for 12 doses, Disp: 12 capsule, Rfl: 0    albuterol sulfate HFA (VENTOLIN HFA) 108 (90 Base) MCG/ACT inhaler, Inhale 2 puffs into the lungs every 6 hours as needed for Wheezing or Shortness of Breath, Disp: 1 Inhaler, Rfl: 6    varenicline (CHANTIX STARTING MONTH DEB) 0.5 MG X 11 & 1 MG X 42 tablet, Take by mouth as directed, Disp: 1 each, Rfl: 0    varenicline (CHANTIX CONTINUING MONTH PAK) 1 MG tablet, Take 1 tablet by mouth 2 times daily, Disp: 60 tablet, Rfl: 1    Review of Systems - History obtained from the patient  General ROS: negative  Psychological ROS: negative  Ophthalmic ROS: negative  Neurological ROS: negative  ENT ROS: negative  Allergy and Immunology ROS: negative  Hematological and Lymphatic ROS: negative  Endocrine ROS: negative  Breast ROS: negative  Respiratory ROS: negative  Cardiovascular ROS: negative  Gastrointestinal ROS:negative  Genito-Urinary ROS: negative  Musculoskeletal ROS: negative   Skin ROS: negative    Physical Exam   Vitals Reviewed   Constitutional: Patient is oriented to person, place, and time. Patient appears well-developed and well-nourished. Patient is active and cooperative. Non-toxic appearance. No distress. HENT:   Head: Normocephalic and atraumatic. Head is without abrasion and without laceration. Hair is normal.   Right Ear: External ear normal. No lacerations. No drainage, swelling . Left Ear: External ear normal. No lacerations. No drainage, swelling. Nose: Nose normal. No nose lacerations or nasal deformity. Eyes: Conjunctivae, EOM and lids are normal. Right eye exhibits no discharge.  No foreign body present in the right eye. Left eye exhibits no discharge. No foreign body present in the left eye. No scleral icterus. Neck: Trachea normal and normal range of motion. No JVD present. Pulmonary/Chest: Effort normal. No accessory muscle usage or stridor. No apnea. No respiratory distress. Cardiovascular: Normal rate and no JVD. Abdominal: Normal appearance. Patient exhibits no distension. Abdomen is soft, obese, non tender. Musculoskeletal: Normal range of motion. Patient exhibits no edema. Neurological: Patient is alert and oriented to person, place, and time. Patient has normal strength. GCS eye subscore is 4. GCS verbal subscore is 5. GCS motor subscore is 6. Skin: Skin is warm and dry. No abrasion and no rash noted. Patient is not diaphoretic. No cyanosis or erythema. Psychiatric: Patient has a normal mood and affect. Speech is normal and behavior is normal. Cognition and memory are normal.       A/P    Jewell Joshi is 35 y.o. male, Body mass index is 47.5 kg/m². pre surgery, has gained 3 lbs since last visit. The patient underwent dietary counseling with registered dietician. I have reviewed, discussed and agree with the dietary plan. Patient is trying hard to keep good dietary and behavior modifications. Patient is monitoring portion sizes, food choices and liquid calories. Patient is trying to exercise regularly as much as possible. We discussed how his weight affects his overall health including:  Patient Active Problem List   Diagnosis    Cold sore    Tobacco dependence    Vasculogenic erectile dysfunction    Morbid obesity with BMI of 45.0-49.9, adult (HCC)    Snoring    Fatty liver    Impaired fasting glucose    Vitamin D deficiency    Mixed hyperlipidemia    Prediabetes    JHON on CPAP    and importance of weight loss to alleviate those co morbid conditions. I encouraged the patient to continue exercise and keeping healthy eating habits.   Discussed pre-op labs and work

## 2019-04-11 NOTE — PROGRESS NOTES
[place] gained 3 lbs over the past month. Pt is frustrated with weight gain; he has been very busy with work  Breakfast: sometimes skips OR avocado and 3 eggs OR Raisin Bran with skim milk    Snack: none OR 2 cutie oranges    Lunch: turkey burger and fruit - [employer] OR quesadilla with cheese, alonso, turkey    Snack: none    Dinner: sushi OR turkey burger (2)    Snack: struggles with going to bed hungry - leftovers    Fluids: water, powerade zero    Is pt consuming smaller portions? no    Is pt consuming at least 64 oz of fluids per day? yes he is    Is pt consuming carbonated, caffeinated, or sugary beverages? yes still drinking some juice; no beer but wine    Has pt sampled Unjury and/or Nectar protein?  yes    Exercise: a lot of yard work - splitting wood, yard work    Plan/Recommendations: avoid alcohol, juice, monitor portions    Handouts: none    Lala Oliva

## 2019-04-29 ENCOUNTER — OFFICE VISIT (OUTPATIENT)
Dept: PULMONOLOGY | Age: 34
End: 2019-04-29
Payer: COMMERCIAL

## 2019-04-29 VITALS
RESPIRATION RATE: 16 BRPM | DIASTOLIC BLOOD PRESSURE: 81 MMHG | HEART RATE: 84 BPM | SYSTOLIC BLOOD PRESSURE: 126 MMHG | WEIGHT: 315 LBS | HEIGHT: 73 IN | BODY MASS INDEX: 41.75 KG/M2 | OXYGEN SATURATION: 96 % | TEMPERATURE: 98.3 F

## 2019-04-29 DIAGNOSIS — Z01.818 PRE-OPERATIVE CLEARANCE: ICD-10-CM

## 2019-04-29 DIAGNOSIS — E66.01 MORBID OBESITY (HCC): ICD-10-CM

## 2019-04-29 DIAGNOSIS — Z87.891 PERSONAL HISTORY OF TOBACCO USE: ICD-10-CM

## 2019-04-29 DIAGNOSIS — G47.10 HYPERSOMNIA: ICD-10-CM

## 2019-04-29 DIAGNOSIS — J98.4 RESTRICTIVE LUNG DISEASE: ICD-10-CM

## 2019-04-29 DIAGNOSIS — G47.33 MODERATE OBSTRUCTIVE SLEEP APNEA: Primary | ICD-10-CM

## 2019-04-29 PROCEDURE — 99214 OFFICE O/P EST MOD 30 MIN: CPT | Performed by: INTERNAL MEDICINE

## 2019-04-29 ASSESSMENT — SLEEP AND FATIGUE QUESTIONNAIRES
HOW LIKELY ARE YOU TO NOD OFF OR FALL ASLEEP WHILE SITTING QUIETLY AFTER LUNCH WITHOUT ALCOHOL: 0
HOW LIKELY ARE YOU TO NOD OFF OR FALL ASLEEP WHILE SITTING AND READING: 0
HOW LIKELY ARE YOU TO NOD OFF OR FALL ASLEEP WHILE SITTING AND TALKING TO SOMEONE: 0
HOW LIKELY ARE YOU TO NOD OFF OR FALL ASLEEP IN A CAR, WHILE STOPPED FOR A FEW MINUTES IN TRAFFIC: 0
NECK CIRCUMFERENCE (INCHES): 18.5
HOW LIKELY ARE YOU TO NOD OFF OR FALL ASLEEP WHILE WATCHING TV: 0
HOW LIKELY ARE YOU TO NOD OFF OR FALL ASLEEP WHILE LYING DOWN TO REST IN THE AFTERNOON WHEN CIRCUMSTANCES PERMIT: 3
HOW LIKELY ARE YOU TO NOD OFF OR FALL ASLEEP WHILE SITTING INACTIVE IN A PUBLIC PLACE: 2
HOW LIKELY ARE YOU TO NOD OFF OR FALL ASLEEP WHEN YOU ARE A PASSENGER IN A CAR FOR AN HOUR WITHOUT A BREAK: 2
ESS TOTAL SCORE: 7

## 2019-04-29 NOTE — PATIENT INSTRUCTIONS
.Please keep all of your future appointments scheduled by [facility] office. Out of respect for other patients and providers, you may be asked to reschedule your appointment if you arrive later than your scheduled appointment time. Appointments cancelled less than 24hrs in advance will be considered a no show. Patients with three missed appointments within 1 year or four missed appointments within 2 years can be dismissed from the practice. Remember to bring your sleep machine, cord and mask to each appointment    You should have a follow up appointment scheduled with a sleep medicine provider within 12 months. Routine parts, masks, tubing and filters should all be obtainable from your DME (equipment) provider. Any problems related to mask fit, comfort or functioning of equipment should also be addressed directly with your DME provider. If you are unable to resolve problems, then please notify our office and schedule an appointment to be seen sooner than the usual follow up appointment. For all patients who are evaluated for sleep disorders, never drive or operate motorized equipment while sleepy, fatigued or groggy. Please bring in all of your sleep equipment to all of your appointments, including machine, mask, cords and hoses. Here are some tips to to getting better sleep  1- Avoid napping during the day: This will ensure you are tired at bedtime. If you have to take a nap, sleep less than one hour, before 3 pm.   2- Exercise regularly, but not right before bed: but the timing of the workout is important. Exercising in the morning or early afternoon will not interfere with sleep. Exercising within two hours before bedtime can decrease your ability to fall asleep. Regular exercise is recommended to help you deepen the sleep. 3- Avoid heavy, spicy, or sugary foods 4-6 hours before bedtime: These can affect your ability to stay asleep.   4- Have a light snack before bed: Having an empty stomach can interfere with your sleep. Dairy products and turkey contain tryptophan, which acts as a natural sleep inducer. 5- Stay away from caffeine, nicotine and alcohol at least 4-6 hours before bed: Caffeine and nicotine are stimulants that interfere with your ability to fall asleep. While alcohol has an immediate sleep-inducing effect, a few hours later, as alcohol levels in your blood start to fall, there is a stimulant effect and you will experience fragmented sleep. 6- Take a hot bath 90 minutes before bedtime:  A hot bath will raise your body temperature, but it is the drop in body temperature that may leave you feeling sleepy  7- Develop sleep rituals: it is important to give your body cues that it is time to slow down and sleep. Listen to relaxing music, read something soothing for 15 minutes, have a cup of caffeine free tea, or do relaxation exercises such as yoga or deep breathing help relieve anxiety and reduce muscle tension. 8- Fix a bedtime and an awakening time: Even on weekends! When your sleep cycle has a regular rhythm, you will feel better. 9- Sleep only when sleepy: This reduces the time you are awake in bed. 10- Get into your favorite sleeping position: If you can't fall asleep within 15-30 minutes, get up and do something boring until you feel sleepy. Sit quietly in the dark or read the warranty on your refrigerator. Don't expose yourself to bright light while you are up, it gives cues to your brain that it is time to wake up. 11- Only use your bed for sleeping: Dont use the bed as an office, workroom or recreation room. Let your body \"know\" that the bed is associated with sleeping  12- Use comfortable bedding. Uncomfortable bedding can prevent good sleep. Evaluate whether or not this is a source of your problem, and make appropriate changes. 13- Make sure your bed and bedroom are quiet and comfortable: A hot room can be uncomfortable.  A cooler room, along with enough blankets to stay warm is recommended. Get a blackout shade or wear a slumber mask and wear earplugs or get a \"white noise\" machine for light and noise distractions. 14- Use sunlight to set your biological clock: When you get up in the morning, go outside and turn your face to the sun for 15 minutes. 13- Dont take your worries to bed: Leave worries about job, school, daily life, etc., behind when you go to bed. Some people find it useful to assign a \"worry period\" during the evening or afternoon for these issues. CPAP Equipment Cleaning and Disinfecting Schedule  Equipment Cleaning Frequency Instructions  Disinfecting Frequency   Non-Disposable Filters  Weekly Mild soapy water, Rinse, Air Dry Not Required   Disposable Filters Change as needed  2-4 weeks Do Not Wash Not Required   Hose/tubing Daily Mild soapy water, Rinse, Air Dry Once a week   Mask / Nasal Pillows Daily Mild soapy water, Rinse, Air Dry Once a week   Headgear Weekly Hand wash, Mild soapy water, Rinse, Dry  Not Required   Humidifier Daily Empty water daily  Mild soapy water, Rinse well, Air Dry  Once a week   CPAP Unit As Needed Dust with damp cloth,  No detergents or sprays Not Required         Disinfect (per schedule) with 1 part white vinegar and 3 parts water- soak mask and water chamber for 30 minutes every 1-2 weeks, more often if sick. Allow water/vinegar mixture to run through tubing. Allow all equipment to air dry. Drying Hints:   Always hang tubing away from direct sunlight, as this will cause the tubing to become yellow, brittle and crack over a period of time. DO NOT attach the wet tubing to your CPAP unit to blow-dry it. The moisture from the tubing can drain back into your machine. Moisture in your unit can cause sudden pressure increases or short circuits  DO's and DON'Ts:  - Don't use alcohol-based products to clean your mask, because it can cause the materials to become hard and brittle.    - Don't put headgear in the washer or dryer  - Don't use any caustic or household cleaning solutions such as bleach on your CPAP   equipment.  - Do follow the recommended cleaning schedule. - Do change your disposable filter frequently. Adapted From: [employer]/cleaning. shtm.   These are general suggestions for all models please follow specific s recommendations and specific instructions

## 2019-04-29 NOTE — COMMUNICATION BODY
Assessment:       · Moderate JHON with significant nocturnal desaturation. APAP 8-16 cmH2O. Suboptimal compliance upon review today. · Morbid obesity  · Mild restrictive defect on PFTs-likely due to obesity  · Bronchodilator response  · 14 pack year smoking - quit currently      Plan:       Change APAP 9-13 cmH2O- done today   Check mask today insure adequate permissible leak  Advised to use CPAP 6-8 hrs at night and during naps. Replacement of mask, tubing, head straps every 3-6 months or sooner if damaged. Follow up CPAP compliance and pressure adjustment if needed  Sleep hygiene  Avoid sedatives, alcohol and caffeinated drinks at bed time. No driving motorized vehicles or operating heavy machinery while fatigue, drowsy or sleepy. Weight loss is also recommended as a long-term intervention. Complications of JHON if not treated were discussed with patient patient to include systemic hypertension, pulmonary hypertension, cardiovascular morbidities, car accidents and all cause mortality. Continue Albuterol 2 puffs Q4-6 hrs PRN  Advised to continue with smoking cessation        Upon examination and discussion I have determined that, from a pulmonary standpoint, patient is clear for surgery. Patient has low preoperative pulmonary risk with 7.5% pulmonary complication rate. Perioperative pulmonary complications were discussed with the patient including atelectasis, infection and prolonged mechanical ventilation. I recommend:  1- Airway extubation (if patient requires intubation for surgery) performed only when the patient is fully awake and alert. 2- Removal of the endotracheal tube should take place in the operating room or PACU. 3- The patient should be maintained in the semiupright or lateral, not supine, position and should undergo continuous cardiorespiratory monitoring. 4- Immediate application of CPAP with or without a nasopharyngeal airway will help prevent upper airway collapse.   5- Systemic opioids should be used cautiously because of their ability to depress the respiratory drive  6- Benzodiazepines should be avoided because of their negative effects on the respiratory control and upper airway musculature.

## 2019-04-29 NOTE — LETTER
[address]  [address]  Phone: [phone]  Fax: [phone]    April 29, 2019     Patient: Mike Peck   MR Number: [id number]   YOB: 1985   Date of Visit: 4/29/2019     Dear Dr. Garcia: Thank you for the request for consultation for Jose Luis Alcazar to me for the evaluation. Below are the relevant portions of my assessment and plan of care. Assessment:       · Moderate JHON with significant nocturnal desaturation. APAP 8-16 cmH2O. Suboptimal compliance upon review today. · Morbid obesity  · Mild restrictive defect on PFTs-likely due to obesity  · Bronchodilator response  · 14 pack year smoking - quit currently      Plan:       Change APAP 9-13 cmH2O- done today   Check mask today insure adequate permissible leak  Advised to use CPAP 6-8 hrs at night and during naps. Replacement of mask, tubing, head straps every 3-6 months or sooner if damaged. Follow up CPAP compliance and pressure adjustment if needed  Sleep hygiene  Avoid sedatives, alcohol and caffeinated drinks at bed time. No driving motorized vehicles or operating heavy machinery while fatigue, drowsy or sleepy. Weight loss is also recommended as a long-term intervention. Complications of JHON if not treated were discussed with patient patient to include systemic hypertension, pulmonary hypertension, cardiovascular morbidities, car accidents and all cause mortality. Continue Albuterol 2 puffs Q4-6 hrs PRN  Advised to continue with smoking cessation        Upon examination and discussion I have determined that, from a pulmonary standpoint, patient is clear for surgery. Patient has low preoperative pulmonary risk with 3.8% pulmonary complication rate. Perioperative pulmonary complications were discussed with the patient including atelectasis, infection and prolonged mechanical ventilation.  I recommend:  1- Airway extubation (if patient requires intubation for surgery) performed only when the patient is fully awake and alert. 2- Removal of the endotracheal tube should take place in the operating room or PACU. 3- The patient should be maintained in the semiupright or lateral, not supine, position and should undergo continuous cardiorespiratory monitoring. 4- Immediate application of CPAP with or without a nasopharyngeal airway will help prevent upper airway collapse. 5- Systemic opioids should be used cautiously because of their ability to depress the respiratory drive  6- Benzodiazepines should be avoided because of their negative effects on the respiratory control and upper airway musculature. If you have questions, please do not hesitate to call me. I look forward to following Keith Jackson along with you.     Sincerely,        Nadine Pride MD

## 2019-05-16 ENCOUNTER — OFFICE VISIT (OUTPATIENT)
Dept: BARIATRICS/WEIGHT MGMT | Age: 34
End: 2019-05-16

## 2019-05-16 VITALS
SYSTOLIC BLOOD PRESSURE: 133 MMHG | DIASTOLIC BLOOD PRESSURE: 89 MMHG | BODY MASS INDEX: 44.1 KG/M2 | RESPIRATION RATE: 18 BRPM | HEIGHT: 71 IN | OXYGEN SATURATION: 98 % | HEART RATE: 94 BPM | WEIGHT: 315 LBS

## 2019-05-16 DIAGNOSIS — Z99.89 OSA ON CPAP: ICD-10-CM

## 2019-05-16 DIAGNOSIS — E78.2 MIXED HYPERLIPIDEMIA: ICD-10-CM

## 2019-05-16 DIAGNOSIS — E66.01 MORBID OBESITY WITH BMI OF 45.0-49.9, ADULT (HCC): Primary | ICD-10-CM

## 2019-05-16 DIAGNOSIS — R73.03 PREDIABETES: ICD-10-CM

## 2019-05-16 DIAGNOSIS — K76.0 FATTY LIVER: ICD-10-CM

## 2019-05-16 DIAGNOSIS — G47.33 OSA ON CPAP: ICD-10-CM

## 2019-05-16 PROCEDURE — 99999 PR OFFICE/OUTPT VISIT,PROCEDURE ONLY: CPT | Performed by: SURGERY

## 2019-05-16 NOTE — PROGRESS NOTES
Never Used    Tobacco comment: on chantix, trying to quit- 2/28/19   Substance Use Topics    Alcohol use: Yes     Comment: occasional     I counseled the patient on the importance of not smoking and risks of ETOH. No Known Allergies  Vitals:    05/16/19 1039   BP: 133/89   Pulse: 94   Resp: 18   SpO2: 98%   Weight: (!) 348 lb (157.9 kg)   Height: 5' 11.25\" (1.81 m)       Body mass index is 48.2 kg/m².     Lab Results   Component Value Date    WBC 7.9 02/19/2019    RBC 5.82 02/19/2019    HGB 16.8 02/19/2019    HCT 50.4 02/19/2019    MCV 86.7 02/19/2019    MCH 28.9 02/19/2019    MCHC 33.3 02/19/2019    MPV 8.3 02/19/2019    NEUTOPHILPCT 61.5 02/19/2019    LYMPHOPCT 28.6 02/19/2019    MONOPCT 7.1 02/19/2019    EOSRELPCT 2.3 02/19/2019    BASOPCT 0.5 02/19/2019    NEUTROABS 4.9 02/19/2019    LYMPHSABS 2.3 02/19/2019    MONOSABS 0.6 02/19/2019    EOSABS 0.2 02/19/2019     Lab Results   Component Value Date     02/19/2019    K 4.4 02/19/2019     02/19/2019    CO2 23 02/19/2019    ANIONGAP 14 02/19/2019    GLUCOSE 105 02/19/2019    BUN 19 02/19/2019    CREATININE 0.6 02/19/2019    LABGLOM >60 02/19/2019    GFRAA >60 02/19/2019    GFRAA >60 01/07/2011    CALCIUM 9.2 02/19/2019    PROT 7.4 02/19/2019    PROT 7.4 01/07/2011    LABALBU 4.3 02/19/2019    AGRATIO 1.4 02/19/2019    BILITOT 0.5 02/19/2019    ALKPHOS 59 02/19/2019    ALT 23 02/19/2019    AST 17 02/19/2019    GLOB 3.1 02/19/2019     Lab Results   Component Value Date    CHOL 172 02/19/2019    TRIG 407 02/19/2019    HDL 31 02/19/2019    HDL 33 01/07/2011    LDLCALC see below 02/19/2019    LABVLDL see below 02/19/2019     Lab Results   Component Value Date    TSHREFLEX 1.20 02/19/2019     Lab Results   Component Value Date    IRON 87 02/19/2019    TIBC 325 02/19/2019    LABIRON 27 02/19/2019     Lab Results   Component Value Date    QYETMQVV40 395 02/19/2019    FOLATE 10.33 02/19/2019     Lab Results   Component Value Date    VITD25 18.5 02/19/2019 Lab Results   Component Value Date    LABA1C 5.9 02/19/2019    .6 02/19/2019         Current Outpatient Medications:     Multiple Vitamins-Minerals (THERAPEUTIC MULTIVITAMIN-MINERALS) tablet, Take 2 tablets by mouth daily, Disp: , Rfl:     vitamin D (ERGOCALCIFEROL) 87895 units CAPS capsule, Take 1 capsule by mouth once a week for 12 doses, Disp: 12 capsule, Rfl: 0    albuterol sulfate HFA (VENTOLIN HFA) 108 (90 Base) MCG/ACT inhaler, Inhale 2 puffs into the lungs every 6 hours as needed for Wheezing or Shortness of Breath, Disp: 1 Inhaler, Rfl: 6    varenicline (CHANTIX STARTING MONTH DEB) 0.5 MG X 11 & 1 MG X 42 tablet, Take by mouth as directed, Disp: 1 each, Rfl: 0    varenicline (CHANTIX CONTINUING MONTH DEB) 1 MG tablet, Take 1 tablet by mouth 2 times daily, Disp: 60 tablet, Rfl: 1    Review of Systems - History obtained from the patient  General ROS: negative  Psychological ROS: negative  Ophthalmic ROS: negative  Neurological ROS: negative  ENT ROS: negative  Allergy and Immunology ROS: negative  Hematological and Lymphatic ROS: negative  Endocrine ROS: negative  Breast ROS: negative  Respiratory ROS: negative  Cardiovascular ROS: negative  Gastrointestinal ROS:negative  Genito-Urinary ROS: negative  Musculoskeletal ROS: negative   Skin ROS: negative    Physical Exam   Vitals Reviewed   Constitutional: Patient is oriented to person, place, and time. Patient appears well-developed and well-nourished. Patient is active and cooperative. Non-toxic appearance. No distress. HENT:   Head: Normocephalic and atraumatic. Head is without abrasion and without laceration. Hair is normal.   Right Ear: External ear normal. No lacerations. No drainage, swelling . Left Ear: External ear normal. No lacerations. No drainage, swelling. Nose: Nose normal. No nose lacerations or nasal deformity. Eyes: Conjunctivae, EOM and lids are normal. Right eye exhibits no discharge.  No foreign body present in the right eye. Left eye exhibits no discharge. No foreign body present in the left eye. No scleral icterus. Neck: Trachea normal and normal range of motion. No JVD present. Pulmonary/Chest: Effort normal. No accessory muscle usage or stridor. No apnea. No respiratory distress. Cardiovascular: Normal rate and no JVD. Abdominal: Normal appearance. Patient exhibits no distension. Abdomen is soft, obese, non tender. Musculoskeletal: Normal range of motion. Patient exhibits no edema. Neurological: Patient is alert and oriented to person, place, and time. Patient has normal strength. GCS eye subscore is 4. GCS verbal subscore is 5. GCS motor subscore is 6. Skin: Skin is warm and dry. No abrasion and no rash noted. Patient is not diaphoretic. No cyanosis or erythema. Psychiatric: Patient has a normal mood and affect. Speech is normal and behavior is normal. Cognition and memory are normal.       A/P    Nelly Kennedy is 35 y.o. male, Body mass index is 48.2 kg/m². pre surgery, has gained 5 lbs since last visit. The patient underwent dietary counseling with registered dietician. I have reviewed, discussed and agree with the dietary plan. Patient is trying hard to keep good dietary and behavior modifications. Patient is monitoring portion sizes, food choices and liquid calories. Patient is trying to exercise regularly as much as possible. We discussed how his weight affects his overall health including:  Patient Active Problem List   Diagnosis    Cold sore    Tobacco dependence    Vasculogenic erectile dysfunction    Morbid obesity with BMI of 45.0-49.9, adult (HCC)    Snoring    Fatty liver    Impaired fasting glucose    Vitamin D deficiency    Mixed hyperlipidemia    Prediabetes    JHON on CPAP    and importance of weight loss to alleviate those co morbid conditions. I encouraged the patient to continue exercise and keeping healthy eating habits. Discussed pre-op labs and work up till now. Also counseled the patient extensively on Surgery. I spent a total of 25 minutes face to face with the patient and greater than 50% of the time was spent in counseling, answering questions, addressing concerns, reviewing labs and/or other studies with the patient as well as coordinating care. Ananya Marshall has made very positive changes with stopping drinking and smoking. However still struggles with his portions and sugar. He has been feeling more hungry and stress eating after smoking cessation, advised him to meet with our behaviorist but he declined. Ananya Marshall also saw a new PCP at [facility] ( care everywhere) and had discussion with the new PCP and decision was made to start him on Adipex. Ananya Marshall forgot to mention that and he explained to me that he did not realize there is other options than surgery and thus wanted to explore it. I advised him that we spoke about medical weight loss multiple times starting at seminar and other at the clinic here. Advised him again that ys its option but given his BMI and comorbid conditions , surgery is standard of care. After long discussion with Ananya Marshall, it is clear he is looking for a quick resolution for his weight problem which in reality is a lifelong lifestyle modifications and surgery is a tool to help him achieve that. At this stage, I don't think its safe to proceed with surgery, he is under lots of stress and is refusing counseling as well my concerns of long term compliance. recommended he continues with his new PCP or continue here in our [facility] and explore that option. I also advised him on getting 2nd surgical opinion. Surgery not an option for now. Consider Medical weight loss     Patient advised that its their responsibility to follow up for studies, referrals and/or labs ordered today.

## 2019-05-16 NOTE — PROGRESS NOTES
Nirmal Ireland gained 5 lbs over 1 month. Pt states he quit smoking a month ago and his snacking is up as a result. Pt is chewing sf gum. Breakfast: skips 2 days a week OR avocado and 3 eggs OR Raisin Bran / strawberry frosted mini wheats with skim milk     Snack: none OR 2 cutie oranges OR avocado OR banana OR deli meat     Lunch: bowl of chicken noodle soup and grilled cheese sand made at home      Snack: none     Dinner: sushi OR protein veg starch     Snack: 1 package poptarts and milk     Fluids: water, powerade zero, crystal light     Is pt consuming smaller portions? no - has been more difficult this month with quitting smoking     Is pt consuming at least 64 oz of fluids per day? yes he is - water, powerade zero, crystal light     Is pt consuming carbonated, caffeinated, or sugary beverages? yes coffee seldomly,      Has pt sampled Unjury and/or Nectar protein?  yes     Exercise: a lot of yard work - splitting wood, yard work     Plan/Recommendations: Avoid poptarts, Switch to lower sugar cereal; Continue to monitor portions    Handouts: none    Jada Granda

## 2019-06-11 ENCOUNTER — TELEPHONE (OUTPATIENT)
Dept: PULMONOLOGY | Age: 34
End: 2019-06-11

## 2019-06-11 NOTE — TELEPHONE ENCOUNTER
Patient did not show for compliance appointment  with Mara Orta on 6/11/19    Same Day Cancellation: No    Patient rescheduled:  No    New appointment:     Patient was also no show on: N/A    LOV 4/29/19       Assessment:       · Moderate JHON with significant nocturnal desaturation. APAP 8-16 cmH2O. Suboptimal compliance upon review today. · Morbid obesity  · Mild restrictive defect on PFTs-likely due to obesity  · Bronchodilator response  · 14 pack year smoking - quit currently  · Preoperative clearance for bariatric surgery      Plan:       · Change APAP 9-13 cmH2O- done today   · Check mask today insure adequate permissible leak  · Advised to use CPAP 6-8 hrs at night and during naps. · Replacement of mask, tubing, head straps every 3-6 months or sooner if damaged. · Follow up CPAP compliance and pressure adjustment if needed  · Sleep hygiene  · Avoid sedatives, alcohol and caffeinated drinks at bed time. · No driving motorized vehicles or operating heavy machinery while fatigue, drowsy or sleepy. · Weight loss is also recommended as a long-term intervention. · Complications of JHON if not treated were discussed with patient patient to include systemic hypertension, pulmonary hypertension, cardiovascular morbidities, car accidents and all cause mortality. · Continue Albuterol 2 puffs Q4-6 hrs PRN  · Advised to continue with smoking cessation          Upon examination and discussion I have determined that, from a pulmonary standpoint, patient is clear for surgery. Patient has low preoperative pulmonary risk with 0.0% pulmonary complication rate. Perioperative pulmonary complications were discussed with the patient including atelectasis, infection and prolonged mechanical ventilation. I recommend:  1- Airway extubation (if patient requires intubation for surgery) performed only when the patient is fully awake and alert.     2- Removal of the endotracheal tube should take place in the operating room or PACU.  3- The patient should be maintained in the semiupright or lateral, not supine, position and should undergo continuous cardiorespiratory monitoring. 4- Immediate application of CPAP with or without a nasopharyngeal airway will help prevent upper airway collapse.   5- Systemic opioids should be used cautiously because of their ability to depress the respiratory drive  6- Benzodiazepines should be avoided because of their negative effects on the respiratory control and upper airway musculature.

## 2019-06-11 NOTE — TELEPHONE ENCOUNTER
Spoke  Pt. re: appt for compliance,. Stated he forgot to put the appt on his calendar, he will call back if he wants to r/s, checking with his insurance co to see if he needs to be seen. Explained appt  Also is to ensure proper use, checking pressures, and fit.

## 2019-06-14 NOTE — TELEPHONE ENCOUNTER
Left voice message requesting patient to please return call to office. Given Hardik recommendation to make a f/u appt.  For 31-90 Adequate: hears normal conversation without difficulty

## 2019-06-20 ENCOUNTER — TELEPHONE (OUTPATIENT)
Dept: BARIATRICS/WEIGHT MGMT | Age: 34
End: 2019-06-20

## 2020-07-04 ENCOUNTER — HOSPITAL ENCOUNTER (EMERGENCY)
Age: 35
Discharge: HOME | End: 2020-07-04
Payer: COMMERCIAL

## 2020-07-04 VITALS
RESPIRATION RATE: 16 BRPM | SYSTOLIC BLOOD PRESSURE: 121 MMHG | OXYGEN SATURATION: 100 % | DIASTOLIC BLOOD PRESSURE: 89 MMHG | HEART RATE: 88 BPM | WEIGHT: 215 LBS | BODY MASS INDEX: 28.49 KG/M2 | HEIGHT: 73 IN

## 2020-07-04 PROCEDURE — 6360000002 HC RX W HCPCS: Performed by: NURSE PRACTITIONER

## 2020-07-04 PROCEDURE — 90715 TDAP VACCINE 7 YRS/> IM: CPT | Performed by: NURSE PRACTITIONER

## 2020-07-04 PROCEDURE — 12002 RPR S/N/AX/GEN/TRNK2.6-7.5CM: CPT

## 2020-07-04 PROCEDURE — 99282 EMERGENCY DEPT VISIT SF MDM: CPT

## 2020-07-04 PROCEDURE — 90471 IMMUNIZATION ADMIN: CPT | Performed by: NURSE PRACTITIONER

## 2020-07-04 RX ADMIN — TETANUS TOXOID, REDUCED DIPHTHERIA TOXOID AND ACELLULAR PERTUSSIS VACCINE, ADSORBED 0.5 ML: 5; 2.5; 8; 8; 2.5 SUSPENSION INTRAMUSCULAR at 18:40

## 2020-07-04 ASSESSMENT — PAIN DESCRIPTION - ONSET: ONSET: ON-GOING

## 2020-07-04 ASSESSMENT — PAIN DESCRIPTION - ORIENTATION: ORIENTATION: LEFT

## 2020-07-04 ASSESSMENT — PAIN DESCRIPTION - PROGRESSION: CLINICAL_PROGRESSION: GRADUALLY WORSENING

## 2020-07-04 ASSESSMENT — PAIN DESCRIPTION - LOCATION: LOCATION: KNEE

## 2020-07-04 ASSESSMENT — PAIN DESCRIPTION - DESCRIPTORS: DESCRIPTORS: CONSTANT

## 2020-07-04 ASSESSMENT — PAIN SCALES - GENERAL: PAINLEVEL_OUTOF10: 1

## 2020-07-04 ASSESSMENT — PAIN DESCRIPTION - FREQUENCY: FREQUENCY: CONTINUOUS

## 2020-07-04 ASSESSMENT — PAIN DESCRIPTION - PAIN TYPE: TYPE: ACUTE PAIN

## 2020-07-04 NOTE — ED PROVIDER NOTES
Paternal Grandfather        PHYSICAL EXAM    VITAL SIGNS: Ht 6' 1\" (1.854 m)   Wt 215 lb (97.5 kg)   BMI 28.37 kg/m²   Constitutional:  Well developed, well-nourished  HENT:  atraumatic, no trismus  NECK:  Supple, No neck swelling  Respiratory:  No respiratory distress  Cardiovascular:  No JVD   Neurologic: Motor and sensory distal to the wound is intact and normal, patient is awake, alert, no slurred speech  Vascular: left pedal pulse 2+, capillary refill less than 2 seconds  Musculoskeletal:  No edema or deformity  Integument:  +4 cm laceration localized over the left lower extremity just proximal to the left knee, the depth down to the subcutaneous tissue, there is no foreign body in the wound, there is no tendon or bone exposed in the wound. PROCEDURE  Laceration Repair Procedure Note  Performed by: myself  Consent:  Verbal consent obtained by patient. Risk of infection and pain discussed, as well as alternatives. Anesthesia:  The patient was placed in the appropriate position and anesthesia obtained by local infiltration using 1% Lidocaine with epinephrine. Repair type:  simple       Pre-procedure:  Patient was prepped and draped in usual sterile fashion  Laceration details:  Location: LLE  Length (cm):  4cm  Preparation:  Patient was prepped and draped in usual sterile fashion  Exploration: Hemostasis achieved with direct pressure, entire depth of wound probed and visualized in a bloodless field   Contaminated: no  Treatment:  Amount of cleaning:  Extensive  Irrigation solution:  High pressure sterile water  Visualized foreign bodies/material removed: no  Skin repair:   Repair method:  Sutures  Suture size:  4-0  Suture material:  Nylon  Suture technique:  Simple interrupted  Approximation:  Close  Number of sutures: 4  Dressing:  Sterile dressing and antibiotic ointment  Patient tolerance of procedure:   Tolerated well, no immediate complications    RADIOLOGY  No orders to display       ED COURSE & return to the ED immediately for any new or worsening symptoms. The patient verbalized understanding. FINAL IMPRESSION    1. Need for tetanus booster    2. Laceration of left lower extremity, initial encounter    3.  Suture of skin wound        PLAN  Discharge with outpatient follow-up (see EMR)      (Please note that this note was completed with a voice recognition program.  Every attempt was made to edit the dictations, but inevitably there remain words that are mis-transcribed.)         Nida Nuno, SILVIA - FERNANDEZ  07/04/20 [id number]

## 2021-05-16 ENCOUNTER — HOSPITAL ENCOUNTER (EMERGENCY)
Age: 36
Discharge: HOME | End: 2021-05-16
Payer: COMMERCIAL

## 2021-09-08 ENCOUNTER — HOSPITAL ENCOUNTER (EMERGENCY)
Age: 36
Discharge: HOME | End: 2021-09-09
Payer: COMMERCIAL

## 2021-09-08 VITALS
DIASTOLIC BLOOD PRESSURE: 76 MMHG | HEART RATE: 100 BPM | BODY MASS INDEX: 28.49 KG/M2 | TEMPERATURE: 96.6 F | HEIGHT: 73 IN | SYSTOLIC BLOOD PRESSURE: 116 MMHG | OXYGEN SATURATION: 98 % | WEIGHT: 215 LBS | RESPIRATION RATE: 18 BRPM

## 2021-09-08 DIAGNOSIS — S81.812A LACERATION OF LEFT LOWER EXTREMITY, INITIAL ENCOUNTER: Primary | ICD-10-CM

## 2021-09-08 PROCEDURE — 99283 EMERGENCY DEPT VISIT LOW MDM: CPT

## 2021-09-08 PROCEDURE — 12002 RPR S/N/AX/GEN/TRNK2.6-7.5CM: CPT

## 2021-09-09 NOTE — ED PROVIDER NOTES
[facility]  ED  EMERGENCY DEPARTMENT ENCOUNTER      This patient was not seen and evaluated by the attending physician. Pt Name: Suzanne Loyola  MRN: [id number]  [place] 1985  Date of evaluation: 9/8/2021  Provider: SILVIA Amaral - CNP-C  PCP: Jenny Smith      History provided by the patient. CHIEFCOMPLAINT:     Chief Complaint   Patient presents with    Laceration     to left leg with chainsaw 2 hrs ago       HISTORY OF PRESENT ILLNESS:      Suzanne Loyola is a 39 y.o. male who presents to [facility]  ED with complaints of laceration to the left leg. Patient states that he was using a chainsaw when he sustained a laceration to the anterior aspect of his left lower leg. Bleeding is controlled, patient is up-to-date on tetanus. Has no other injuries or complaints. Is here for further evaluation. LOCATION:-  QUALITY:-  SEVERITY:-  DURATION:-  MODIFYING FACTORS:-    Nursing Notes were reviewed     REVIEW OF SYSTEMS:     Review of Systems  All systems, a total of 10, are reviewed and negative except for those that were just noted in history present illness.         PAST MEDICAL HISTORY:     Past Medical History:   Diagnosis Date    Depression     Hypertension     Sleep apnea     no sleep studies         SURGICAL HISTORY:      Past Surgical History:   Procedure Laterality Date    HAND SURGERY      LAPAROSCOPIC APPENDECTOMY N/A 11-26-13    LAPAROSCOPIC APPENDECTOMY                 TONSILLECTOMY      WISDOM TOOTH EXTRACTION           CURRENT MEDICATIONS:       Discharge Medication List as of 9/8/2021 11:58 PM      CONTINUE these medications which have NOT CHANGED    Details   vitamin D (ERGOCALCIFEROL) 47542 units CAPS capsule Take 1 capsule by mouth once a week for 12 doses, Disp-12 capsule, R-0Normal      varenicline (CHANTIX CONTINUING MONTH DEB) 1 MG tablet Take 1 tablet by mouth 2 times daily, Disp-60 tablet, R-1Normal ALLERGIES:    Patient has no known allergies. FAMILY HISTORY:       Family History   Problem Relation Age of Onset    Diabetes Mother     High Blood Pressure Mother     High Cholesterol Mother     Diabetes Father     High Blood Pressure Father     High Cholesterol Father     Heart Disease Brother         aortic stenosis    Cancer Maternal Grandmother     Cancer Maternal Grandfather     Cancer Paternal Grandmother     Cancer Paternal Grandfather           SOCIAL HISTORY:     Social History     Socioeconomic History    Marital status:      Spouse name: None    Number of children: None    Years of education: None    Highest education level: None   Occupational History    None   Tobacco Use    Smoking status: Current Every Day Smoker     Packs/day: 0.50     Years: 11.00     Pack years: 5.50     Types: Cigarettes     Last attempt to quit: 12/15/2016     Years since quittin.7    Smokeless tobacco: Never Used    Tobacco comment: on chantix, trying to quit- 19   Vaping Use    Vaping Use: Never used   Substance and Sexual Activity    Alcohol use: Yes     Comment: occasional    Drug use: No    Sexual activity: Yes     Partners: Female   Other Topics Concern    None   Social History Narrative    None     Social Determinants of Health     Financial Resource Strain:     Difficulty of Paying Living Expenses:    Food Insecurity:     Worried About Running Out of Food in the Last Year:     Ran Out of Food in the Last Year:    Transportation Needs:     Lack of Transportation (Medical):      Lack of Transportation (Non-Medical):    Physical Activity:     Days of Exercise per Week:     Minutes of Exercise per Session:    Stress:     Feeling of Stress :    Social Connections:     Frequency of Communication with Friends and Family:     Frequency of Social Gatherings with Friends and Family:     Attends Advent Services:     Active Member of Clubs or Organizations:     Attends Club or Organization Meetings:     Marital Status:    Intimate Partner Violence:     Fear of Current or Ex-Partner:     Emotionally Abused:     Physically Abused:     Sexually Abused:        SCREENINGS:             PHYSICAL EXAM:       ED Triage Vitals [09/08/21 2227]   BP Temp Temp Source Pulse Resp SpO2 Height Weight   116/76 96.6 °F (35.9 °C) Temporal 100 18 98 % 6' 1\" (1.854 m) 215 lb (97.5 kg)       Physical Exam    CONSTITUTIONAL: Awake and alert. Cooperative. Well-developed. Well-nourished. Vitals:    09/08/21 2227   BP: 116/76   Pulse: 100   Resp: 18   Temp: 96.6 °F (35.9 °C)   TempSrc: Temporal   SpO2: 98%   Weight: 215 lb (97.5 kg)   Height: 6' 1\" (1.854 m)     HENT: Normocephalic. Atraumatic. External ears normal, without discharge. Nonasal discharge. Mucous membranes moist.  EYES: Conjunctiva non-injected, no lid abnormalities noted. No scleral icterus. EOM's grossly intact. Anterior chambers clear. NECK: Supple. Normal ROM. No meningismus. No thyroid tenderness or swelling noted. CARDIOVASCULAR: no tachycardia per vital signs. PULMONARY/CHEST WALL: Effort normal. No tachypnea. No audible adventitious breath sounds. ABDOMEN: No obvious abdominal distention, no obvious hernias. Back: Spine is midline. No obvious trauma or outward signs of cauda equina  /ANORECTAL: Not assessed  MUSKULOSKELETAL: Normal ROM. No acute deformities. No edema. SKIN: Warm and dry. 4 cm laceration present to the anterior aspect of left lower leg just inferior to the knee. Patient wound is very superficial but gaping, bleeding is controlled, no foreign body no tendon involvement. NEUROLOGICAL:  GCS 15. No obvious focal neurological deficits. PSYCHIATRIC: Normal affect, normal insight and judgement. Alert and oriented x 3. DIAGNOSTIC RESULTS:     LABS:    No results found for this visit on 09/08/21. RADIOLOGY:  All x-ray studies are viewed/reviewed by me.   Formal interpretations per the radiologist are as follows: No orders to display           EKG:  See EKG interpretation by an attending physician. PROCEDURES:   PROCEDURE:  LACERATION REPAIR  Sacha Robin or their surrogate had an opportunity to ask questions, and the risks, benefits, and alternatives were discussed. The laceration is 4cm in length. The wound was prepped and draped to maintain a sterile field. A local anesthetic was used to completely anesthetize the wound. It was copiously irrigated. It was explored to its depth in a bloodless field with no sign of tendon, nerve, or vascular injury. No foreign bodies were identified. It was closed with 4-0 ethilon sutures. There were no complications during the procedure. CRITICAL CARE TIME:   N/A    CONSULTS:  None      EMERGENCY DEPARTMENT COURSE andDIFFERENTIAL DIAGNOSIS/MDM:   Vitals:    Vitals:    09/08/21 2227   BP: 116/76   Pulse: 100   Resp: 18   Temp: 96.6 °F (35.9 °C)   TempSrc: Temporal   SpO2: 98%   Weight: 215 lb (97.5 kg)   Height: 6' 1\" (1.854 m)       Patient wasgiven the following medications:  Medications - No data to display      Patient was evaluated independently by myself with the attending physician available for consultation. Patient presented to the emergency room today with complaints of a laceration to the left lower leg. Laceration was cleansed and closed, patient tolerated well. See procedure note. Patient was discharged in good condition is up-to-date on tetanus. Patient laboratory studies, radiographic imaging, and assessment were all discussed with the patient and/orpatient family. There was shared decision-making between myself as well as the patient and/or their surrogate and we are all in agreement with discharge home. There was an opportunity for questions and all questions were answered tothe best of my ability and to the satisfaction of the patient and/or patient family. FINAL IMPRESSION:      1.  Laceration of left lower extremity,

## 2022-01-14 ENCOUNTER — APPOINTMENT (OUTPATIENT)
Dept: MRI IMAGING | Age: 37
End: 2022-01-14
Payer: COMMERCIAL

## 2022-01-14 ENCOUNTER — HOSPITAL ENCOUNTER (OUTPATIENT)
Age: 37
Setting detail: OBSERVATION
Discharge: HOME | End: 2022-01-16
Attending: EMERGENCY MEDICINE | Admitting: INTERNAL MEDICINE
Payer: COMMERCIAL

## 2022-01-14 DIAGNOSIS — M54.41 ACUTE LEFT-SIDED LOW BACK PAIN WITH BILATERAL SCIATICA: Primary | ICD-10-CM

## 2022-01-14 DIAGNOSIS — M54.42 ACUTE LEFT-SIDED LOW BACK PAIN WITH BILATERAL SCIATICA: Primary | ICD-10-CM

## 2022-01-14 PROBLEM — M54.9 BACK PAIN: Status: ACTIVE | Noted: 2022-01-14

## 2022-01-14 LAB
ANION GAP SERPL CALCULATED.3IONS-SCNC: 9 MMOL/L (ref 3–16)
BASOPHILS ABSOLUTE: 0 K/UL (ref 0–0.2)
BASOPHILS RELATIVE PERCENT: 0.6 %
BUN BLDV-MCNC: 21 MG/DL (ref 7–20)
CALCIUM SERPL-MCNC: 8.9 MG/DL (ref 8.3–10.6)
CHLORIDE BLD-SCNC: 103 MMOL/L (ref 99–110)
CO2: 29 MMOL/L (ref 21–32)
CREAT SERPL-MCNC: 0.8 MG/DL (ref 0.9–1.3)
EOSINOPHILS ABSOLUTE: 0.2 K/UL (ref 0–0.6)
EOSINOPHILS RELATIVE PERCENT: 2.6 %
GFR AFRICAN AMERICAN: >60
GFR NON-AFRICAN AMERICAN: >60
GLUCOSE BLD-MCNC: 100 MG/DL (ref 70–99)
HCT VFR BLD CALC: 42.7 % (ref 40.5–52.5)
HEMOGLOBIN: 14.6 G/DL (ref 13.5–17.5)
LYMPHOCYTES ABSOLUTE: 2 K/UL (ref 1–5.1)
LYMPHOCYTES RELATIVE PERCENT: 30.6 %
MCH RBC QN AUTO: 29.6 PG (ref 26–34)
MCHC RBC AUTO-ENTMCNC: 34.3 G/DL (ref 31–36)
MCV RBC AUTO: 86.4 FL (ref 80–100)
MONOCYTES ABSOLUTE: 0.5 K/UL (ref 0–1.3)
MONOCYTES RELATIVE PERCENT: 7.7 %
NEUTROPHILS ABSOLUTE: 3.8 K/UL (ref 1.7–7.7)
NEUTROPHILS RELATIVE PERCENT: 58.5 %
PDW BLD-RTO: 13.6 % (ref 12.4–15.4)
PLATELET # BLD: 234 K/UL (ref 135–450)
PMV BLD AUTO: 7.1 FL (ref 5–10.5)
POTASSIUM REFLEX MAGNESIUM: 4.1 MMOL/L (ref 3.5–5.1)
RBC # BLD: 4.94 M/UL (ref 4.2–5.9)
SODIUM BLD-SCNC: 141 MMOL/L (ref 136–145)
WBC # BLD: 6.4 K/UL (ref 4–11)

## 2022-01-14 PROCEDURE — 96375 TX/PRO/DX INJ NEW DRUG ADDON: CPT

## 2022-01-14 PROCEDURE — 6370000000 HC RX 637 (ALT 250 FOR IP): Performed by: EMERGENCY MEDICINE

## 2022-01-14 PROCEDURE — G0378 HOSPITAL OBSERVATION PER HR: HCPCS

## 2022-01-14 PROCEDURE — 2580000003 HC RX 258: Performed by: PHYSICIAN ASSISTANT

## 2022-01-14 PROCEDURE — 6370000000 HC RX 637 (ALT 250 FOR IP): Performed by: PHYSICIAN ASSISTANT

## 2022-01-14 PROCEDURE — 6370000000 HC RX 637 (ALT 250 FOR IP): Performed by: NEUROLOGICAL SURGERY

## 2022-01-14 PROCEDURE — 72148 MRI LUMBAR SPINE W/O DYE: CPT

## 2022-01-14 PROCEDURE — 80048 BASIC METABOLIC PNL TOTAL CA: CPT

## 2022-01-14 PROCEDURE — 97530 THERAPEUTIC ACTIVITIES: CPT

## 2022-01-14 PROCEDURE — 85025 COMPLETE CBC W/AUTO DIFF WBC: CPT

## 2022-01-14 PROCEDURE — 6370000000 HC RX 637 (ALT 250 FOR IP): Performed by: INTERNAL MEDICINE

## 2022-01-14 PROCEDURE — 6360000002 HC RX W HCPCS: Performed by: PHYSICIAN ASSISTANT

## 2022-01-14 PROCEDURE — 96374 THER/PROPH/DIAG INJ IV PUSH: CPT

## 2022-01-14 PROCEDURE — 99285 EMERGENCY DEPT VISIT HI MDM: CPT

## 2022-01-14 PROCEDURE — 97162 PT EVAL MOD COMPLEX 30 MIN: CPT

## 2022-01-14 PROCEDURE — 6370000000 HC RX 637 (ALT 250 FOR IP): Performed by: STUDENT IN AN ORGANIZED HEALTH CARE EDUCATION/TRAINING PROGRAM

## 2022-01-14 PROCEDURE — 6360000002 HC RX W HCPCS: Performed by: STUDENT IN AN ORGANIZED HEALTH CARE EDUCATION/TRAINING PROGRAM

## 2022-01-14 PROCEDURE — 96376 TX/PRO/DX INJ SAME DRUG ADON: CPT

## 2022-01-14 PROCEDURE — 6360000002 HC RX W HCPCS: Performed by: NEUROLOGICAL SURGERY

## 2022-01-14 RX ORDER — KETOROLAC TROMETHAMINE 30 MG/ML
30 INJECTION, SOLUTION INTRAMUSCULAR; INTRAVENOUS EVERY 6 HOURS PRN
Status: DISCONTINUED | OUTPATIENT
Start: 2022-01-14 | End: 2022-01-16 | Stop reason: HOSPADM

## 2022-01-14 RX ORDER — SODIUM CHLORIDE 9 MG/ML
25 INJECTION, SOLUTION INTRAVENOUS PRN
Status: DISCONTINUED | OUTPATIENT
Start: 2022-01-14 | End: 2022-01-16 | Stop reason: HOSPADM

## 2022-01-14 RX ORDER — DIAZEPAM 5 MG/1
5 TABLET ORAL ONCE
Status: COMPLETED | OUTPATIENT
Start: 2022-01-14 | End: 2022-01-14

## 2022-01-14 RX ORDER — METHOCARBAMOL 500 MG/1
1000 TABLET, FILM COATED ORAL 3 TIMES DAILY
Status: DISCONTINUED | OUTPATIENT
Start: 2022-01-14 | End: 2022-01-16 | Stop reason: HOSPADM

## 2022-01-14 RX ORDER — HYDROCODONE BITARTRATE AND ACETAMINOPHEN 5; 325 MG/1; MG/1
1 TABLET ORAL EVERY 4 HOURS PRN
Status: DISCONTINUED | OUTPATIENT
Start: 2022-01-14 | End: 2022-01-16 | Stop reason: HOSPADM

## 2022-01-14 RX ORDER — NICOTINE 21 MG/24HR
1 PATCH, TRANSDERMAL 24 HOURS TRANSDERMAL DAILY
Status: DISCONTINUED | OUTPATIENT
Start: 2022-01-14 | End: 2022-01-16 | Stop reason: HOSPADM

## 2022-01-14 RX ORDER — PREDNISONE 20 MG/1
60 TABLET ORAL ONCE
Status: COMPLETED | OUTPATIENT
Start: 2022-01-14 | End: 2022-01-14

## 2022-01-14 RX ORDER — ONDANSETRON 4 MG/1
4 TABLET, ORALLY DISINTEGRATING ORAL EVERY 8 HOURS PRN
Status: DISCONTINUED | OUTPATIENT
Start: 2022-01-14 | End: 2022-01-16 | Stop reason: HOSPADM

## 2022-01-14 RX ORDER — KETOROLAC TROMETHAMINE 30 MG/ML
15 INJECTION, SOLUTION INTRAMUSCULAR; INTRAVENOUS ONCE
Status: COMPLETED | OUTPATIENT
Start: 2022-01-14 | End: 2022-01-14

## 2022-01-14 RX ORDER — ONDANSETRON 2 MG/ML
4 INJECTION INTRAMUSCULAR; INTRAVENOUS EVERY 6 HOURS PRN
Status: DISCONTINUED | OUTPATIENT
Start: 2022-01-14 | End: 2022-01-16 | Stop reason: HOSPADM

## 2022-01-14 RX ORDER — POLYETHYLENE GLYCOL 3350 17 G/17G
17 POWDER, FOR SOLUTION ORAL DAILY PRN
Status: DISCONTINUED | OUTPATIENT
Start: 2022-01-14 | End: 2022-01-16 | Stop reason: HOSPADM

## 2022-01-14 RX ORDER — METHOCARBAMOL 750 MG/1
1500 TABLET, FILM COATED ORAL ONCE
Status: COMPLETED | OUTPATIENT
Start: 2022-01-14 | End: 2022-01-14

## 2022-01-14 RX ORDER — LIDOCAINE 4 G/G
1 PATCH TOPICAL DAILY
Status: DISCONTINUED | OUTPATIENT
Start: 2022-01-14 | End: 2022-01-16 | Stop reason: HOSPADM

## 2022-01-14 RX ORDER — ACETAMINOPHEN 650 MG/1
650 SUPPOSITORY RECTAL EVERY 6 HOURS PRN
Status: DISCONTINUED | OUTPATIENT
Start: 2022-01-14 | End: 2022-01-16 | Stop reason: HOSPADM

## 2022-01-14 RX ORDER — SODIUM CHLORIDE 0.9 % (FLUSH) 0.9 %
5-40 SYRINGE (ML) INJECTION PRN
Status: DISCONTINUED | OUTPATIENT
Start: 2022-01-14 | End: 2022-01-16 | Stop reason: HOSPADM

## 2022-01-14 RX ORDER — GABAPENTIN 300 MG/1
300 CAPSULE ORAL 3 TIMES DAILY
Status: DISCONTINUED | OUTPATIENT
Start: 2022-01-14 | End: 2022-01-16 | Stop reason: HOSPADM

## 2022-01-14 RX ORDER — ACETAMINOPHEN 325 MG/1
650 TABLET ORAL EVERY 6 HOURS PRN
Status: DISCONTINUED | OUTPATIENT
Start: 2022-01-14 | End: 2022-01-16 | Stop reason: HOSPADM

## 2022-01-14 RX ORDER — SODIUM CHLORIDE 0.9 % (FLUSH) 0.9 %
5-40 SYRINGE (ML) INJECTION EVERY 12 HOURS SCHEDULED
Status: DISCONTINUED | OUTPATIENT
Start: 2022-01-14 | End: 2022-01-16 | Stop reason: HOSPADM

## 2022-01-14 RX ORDER — ACETAMINOPHEN 500 MG
1000 TABLET ORAL ONCE
Status: COMPLETED | OUTPATIENT
Start: 2022-01-14 | End: 2022-01-14

## 2022-01-14 RX ORDER — DEXAMETHASONE SODIUM PHOSPHATE 4 MG/ML
4 INJECTION, SOLUTION INTRA-ARTICULAR; INTRALESIONAL; INTRAMUSCULAR; INTRAVENOUS; SOFT TISSUE EVERY 6 HOURS
Status: DISCONTINUED | OUTPATIENT
Start: 2022-01-14 | End: 2022-01-16

## 2022-01-14 RX ADMIN — KETOROLAC TROMETHAMINE 30 MG: 30 INJECTION, SOLUTION INTRAMUSCULAR at 19:08

## 2022-01-14 RX ADMIN — DIAZEPAM 5 MG: 5 TABLET ORAL at 03:38

## 2022-01-14 RX ADMIN — DEXAMETHASONE SODIUM PHOSPHATE 4 MG: 4 INJECTION, SOLUTION INTRAMUSCULAR; INTRAVENOUS at 23:39

## 2022-01-14 RX ADMIN — METHOCARBAMOL 1000 MG: 500 TABLET ORAL at 20:29

## 2022-01-14 RX ADMIN — Medication 10 ML: at 20:30

## 2022-01-14 RX ADMIN — METHOCARBAMOL TABLETS 1500 MG: 750 TABLET, COATED ORAL at 07:11

## 2022-01-14 RX ADMIN — GABAPENTIN 300 MG: 300 CAPSULE ORAL at 20:29

## 2022-01-14 RX ADMIN — DEXAMETHASONE SODIUM PHOSPHATE 4 MG: 4 INJECTION, SOLUTION INTRAMUSCULAR; INTRAVENOUS at 19:00

## 2022-01-14 RX ADMIN — ACETAMINOPHEN 1000 MG: 500 TABLET ORAL at 07:11

## 2022-01-14 RX ADMIN — Medication 10 ML: at 23:39

## 2022-01-14 RX ADMIN — HYDROCODONE BITARTRATE AND ACETAMINOPHEN 1 TABLET: 5; 325 TABLET ORAL at 20:29

## 2022-01-14 RX ADMIN — KETOROLAC TROMETHAMINE 15 MG: 30 INJECTION, SOLUTION INTRAMUSCULAR at 07:10

## 2022-01-14 RX ADMIN — PREDNISONE 60 MG: 20 TABLET ORAL at 13:17

## 2022-01-14 ASSESSMENT — PAIN SCALES - GENERAL
PAINLEVEL_OUTOF10: 2
PAINLEVEL_OUTOF10: 2
PAINLEVEL_OUTOF10: 10
PAINLEVEL_OUTOF10: 4
PAINLEVEL_OUTOF10: 10
PAINLEVEL_OUTOF10: 9
PAINLEVEL_OUTOF10: 9

## 2022-01-14 ASSESSMENT — PAIN DESCRIPTION - LOCATION
LOCATION: BACK

## 2022-01-14 NOTE — H&P
Hospital Medicine History & Physical      PCP: Sierra Key    Date of Admission: 1/14/2022    Date of Service: Pt seen/examined on 1/14/2022 and Admitted to observation with expected LOS less than 2 midnights. Chief Complaint:    Back pain/injury at work    History Of Present Illness:   39 y.o. male who presented to [facility] with with a history of depression, hypertension with complaints of back pain after a work injury. Patient states that he was helping lift a crate that was 200 to 400 pounds off a truck when he felt like his back gave out and he had immediate low back pain. He states he is able to return home and tried to bear the pain but reports the pain worsened at home and he noticed his ability to walk decreased. He states the pain does radiate down both of his legs. He describes the pain as sharp and feels like a knife is twisting when he moves. He rates his pain as a 7 out of 10 after receiving pain medicine but a 10 out of 10 upon presentation. He denies saddle anesthesia, loss of bowel or bladder control, shortness of breath, dysuria, lightheaded or dizziness, abdominal pain, N/V/D, or weakness in his arms or legs. Past Medical History:          Diagnosis Date    Depression     Hypertension     Sleep apnea     no sleep studies       Past Surgical History:          Procedure Laterality Date    HAND SURGERY      LAPAROSCOPIC APPENDECTOMY N/A 11-26-13    LAPAROSCOPIC APPENDECTOMY                 TONSILLECTOMY      WISDOM TOOTH EXTRACTION         Medications Prior to Admission:      Prior to Admission medications    Medication Sig Start Date End Date Taking?  Authorizing Provider   vitamin D (ERGOCALCIFEROL) 35480 units CAPS capsule Take 1 capsule by mouth once a week for 12 doses 2/28/19 5/17/19  SILVIA Verde - CNP   varenicline (CHANTIX CONTINUING MONTH DEB) 1 MG tablet Take 1 tablet by mouth 2 times daily 1/14/19 3/15/19  Rosangela Dixon MD       Allergies: Patient has no known allergies. Social History:      The patient currently lives home    TOBACCO:   reports that he has been smoking cigarettes. He has a 5.50 pack-year smoking history. He has never used smokeless tobacco.  ETOH:   reports current alcohol use. E-Cigarettes/Vaping Use     Questions Responses    E-Cigarette/Vaping Use Never User    Start Date     Passive Exposure     Quit Date     Counseling Given     Comments             Family History:      Reviewed in detail and negative for DM, CAD, Cancer, CVA. Positive as follows:        Problem Relation Age of Onset    Diabetes Mother     High Blood Pressure Mother     High Cholesterol Mother     Diabetes Father     High Blood Pressure Father     High Cholesterol Father     Heart Disease Brother         aortic stenosis    Cancer Maternal Grandmother     Cancer Maternal Grandfather     Cancer Paternal Grandmother     Cancer Paternal Grandfather        REVIEW OF SYSTEMS COMPLETED:   Pertinent positives as noted in the HPI. All other systems reviewed and negative. PHYSICAL EXAM PERFORMED:    /78   Pulse 87   Temp 98 °F (36.7 °C)   Resp 20   SpO2 98%     General appearance:  No apparent distress, appears stated age and cooperative. HEENT:  Normal cephalic, atraumatic without obvious deformity. Pupils equal, round, and reactive to light. Extra ocular muscles intact. Conjunctivae/corneas clear. Neck: Supple, with full range of motion. No jugular venous distention. Trachea midline. Respiratory:  Normal respiratory effort. Clear to auscultation, bilaterally without Rales/Wheezes/Rhonchi. Cardiovascular:  Regular rate and rhythm with normal S1/S2 without murmurs, rubs or gallops. Abdomen: Soft, non-tender, non-distended with normal bowel sounds. Musculoskeletal: Lumbar paraspinal muscles tender to palpation, no step-off or deformities noted throughout spine.   Decreased PROM and AROM of bilateral lower extremities secondary to pain, sensation intact no clubbing, cyanosis or edema bilaterally. Full range of motion without deformity. Skin: Skin color, texture, turgor normal.  No rashes or lesions. Neurologic:  Neurovascularly intact without any focal sensory/motor deficits. Cranial nerves: II-XII intact, grossly non-focal.  Psychiatric:  Alert and oriented, thought content appropriate, normal insight  Capillary Refill: Brisk,3 seconds, normal  Peripheral Pulses: +2 palpable, equal bilaterally       Labs:     Recent Labs     01/14/22  0354   WBC 6.4   HGB 14.6   HCT 42.7        Recent Labs     01/14/22  0354      K 4.1      CO2 29   BUN 21*   CREATININE 0.8*   CALCIUM 8.9     No results for input(s): AST, ALT, BILIDIR, BILITOT, ALKPHOS in the last 72 hours. No results for input(s): INR in the last 72 hours. No results for input(s): Gagandeep Aguirre in the last 72 hours. Urinalysis:      Lab Results   Component Value Date    NITRU Negative 11/26/2013    BLOODU TRACE-INTACT 11/26/2013    SPECGRAV 1.025 11/26/2013    GLUCOSEU Negative 11/26/2013       Radiology:     CXR: I have reviewed the CXR with the following interpretation: Not performed  EKG:  I have reviewed the EKG with the following interpretation: Not performed    MRI LUMBAR SPINE WO CONTRAST   Final Result   1. Moderate spinal canal stenosis at L3-L4 with mild stenosis at L4-L5. 2. There may be impingement upon the right L4 nerve root at L3-L4 as well as   impingement of the right S1 nerve root at L5-S1.   3. A disc bulge at L4-L5 contacts the L5 nerve roots bilaterally. 4. Multilevel neural foraminal narrowing as above.    5. Minimal retrolisthesis at L5-S1.             ASSESSMENT:    Active Hospital Problems    Diagnosis Date Noted    Back pain [M54.9] 01/14/2022         PLAN:    Right L4 nerve root impingement  Disc bulge at L4-L5 with effacement on the L5 nerve root  - Orthopedic spine, consulted appreciate recs  - Pain control with lidocaine patches, Robaxin, Toradol as needed  - Up with assistance  - PT/OT evaluation ARU consulted for potential IPR    DVT Prophylaxis: Lovenox  Diet: No diet orders on file  Code Status: No Order    PT/OT Eval Status: Following    Dispo -likely 1 to 2 days       ROCÍO Davila    Thank you Enrique Muñoz for the opportunity to be involved in this patient's care. If you have any questions or concerns please feel free to contact me at [phone].

## 2022-01-14 NOTE — CONSULTS
Neurosurgery Consult Note    IP CONSULT TO ORTHOPEDIC SURGERY  IP CONSULT TO HOSPITALIST  IP CONSULT TO ORTHOPEDIC SURGERY    Subjective:  CHIEF COMPLAINT / HPI:  Danni Larson is a 39 y.o. 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 pt denies any loss of bowel or bladder. denies numbness. pain shoots down both legs. Consult to orthopedics placed. Apparently ortho spine not available for consultation. 38 y/o man lifted a heavy crate yesterday and developed severe sudden onset of low back pain. Intermittent pain down both legs when standing. Pain worsened throughout the day and ultimately came to the ER last night. Currently moderate pain at rest but more severe when he moves. Pain in legs only with standing. No numbness or weakness. No significant baseline back problems.       ALLERGIES:  No Known Allergies     CURRENT MEDS:  Current Facility-Administered Medications: sodium chloride flush 0.9 % injection 5-40 mL, 5-40 mL, IntraVENous, 2 times per day  sodium chloride flush 0.9 % injection 5-40 mL, 5-40 mL, IntraVENous, PRN  0.9 % sodium chloride infusion, 25 mL, IntraVENous, PRN  [START ON 1/15/2022] enoxaparin (LOVENOX) injection 40 mg, 40 mg, SubCUTAneous, Daily  ondansetron (ZOFRAN-ODT) disintegrating tablet 4 mg, 4 mg, Oral, Q8H PRN **OR** ondansetron (ZOFRAN) injection 4 mg, 4 mg, IntraVENous, Q6H PRN  polyethylene glycol (GLYCOLAX) packet 17 g, 17 g, Oral, Daily PRN  acetaminophen (TYLENOL) tablet 650 mg, 650 mg, Oral, Q6H PRN **OR** acetaminophen (TYLENOL) suppository 650 mg, 650 mg, Rectal, Q6H PRN  methocarbamol (ROBAXIN) tablet 1,000 mg, 1,000 mg, Oral, TID  ketorolac (TORADOL) injection 30 mg, 30 mg, IntraVENous, Q6H PRN  nicotine (NICODERM CQ) 21 MG/24HR 1 patch, 1 patch, TransDERmal, Daily  lidocaine 4 % external patch 1 patch, 1 patch, TransDERmal, Daily    PAST MEDICAL HISTORY:  Past Medical History:   Diagnosis Date    Depression     Hypertension     Sleep apnea     no sleep studies        PAST SURGICAL HISTORY:   has a past surgical history that includes Tonsillectomy; [place] tooth extraction; Hand surgery; and laparoscopic appendectomy (N/A, 11-26-13). SOCIAL HISTORY:   reports that he has been smoking cigarettes. He has a 5.50 pack-year smoking history. He has never used smokeless tobacco. He reports current alcohol use. He reports that he does not use drugs. FAMILY HISTORY:  Family History   Problem Relation Age of Onset    Diabetes Mother     High Blood Pressure Mother     High Cholesterol Mother     Diabetes Father     High Blood Pressure Father     High Cholesterol Father     Heart Disease Brother         aortic stenosis    Cancer Maternal Grandmother     Cancer Maternal Grandfather     Cancer Paternal Grandmother     Cancer Paternal Grandfather        REVIEW OF SYSTEMS:  See HPI.      PHYSICAL EXAMINATION:  VITALS:  /89   Pulse 84   Temp 98 °F (36.7 °C)   Resp 18   Ht 6' 1\" (1.854 m)   Wt 215 lb (97.5 kg)   SpO2 95%   BMI 28.37 kg/m²   Gen: adult male, NAD  Neuro: AAO BLE 5/5 +back pain with SLR bilaterally, sensation intact  Station/gait not tested    Minimal Lspine tenderness at lumbosacral junction    LABORATORY DATA:   CBC:   Lab Results   Component Value Date    WBC 6.4 01/14/2022    HGB 14.6 01/14/2022    HCT 42.7 01/14/2022    MCV 86.4 01/14/2022     01/14/2022     BMP:   Lab Results   Component Value Date     01/14/2022    K 4.1 01/14/2022     01/14/2022    CO2 29 01/14/2022    BUN 21 01/14/2022    CREATININE 0.8 01/14/2022    CALCIUM 8.9 01/14/2022     PT/INR: No results found for: PROTIME, INR  APTT: No results found for: APTT    IMAGING STUDIES:     MRI LUMBAR SPINE WO CONTRAST    Result Date: 1/14/2022  EXAMINATION: MRI OF THE LUMBAR SPINE WITHOUT CONTRAST, 1/14/2022 5:28 am TECHNIQUE: Multiplanar multisequence MRI of the lumbar spine was performed without the administration of intravenous contrast. COMPARISON: None. HISTORY: ORDERING SYSTEM PROVIDED HISTORY: back injury, pain and bilateral lower extremity weakness TECHNOLOGIST PROVIDED HISTORY: Reason for exam:->back injury, pain and bilateral lower extremity weakness Decision Support Exception - unselect if not a suspected or confirmed emergency medical condition->Emergency Medical Condition (MA) Reason for Exam: LIFTING INJURY/ SHARP PAIN IN BACK AND DOWN BOTH LEGS EARLY THIS AM Relevant Medical/Surgical History: LIFTING 200+  POUNDS AT WORK THIS MORNING Initial evaluation. FINDINGS: BONES/ALIGNMENT: The vertebral body heights appear maintained aside from Schmorl's nodes. There is slight straightening of the normal lumbar lordosis. Minimal retrolisthesis at L5-S1. No spondylolisthesis at the remaining levels. There is disc desiccation at L3-L4 through L5-S1. The bone marrow signal demonstrates no acute abnormality. SPINAL CORD: The conus terminates at a normal level. SOFT TISSUES: No paraspinal mass identified. L1-L2: There is no significant disc bulge, spinal canal stenosis or neural foraminal narrowing. L2-L3: There is a disc bulge contacting the ventral thecal sac along with buckling of the ligamentum flavum and facet arthrosis. No significant spinal canal stenosis. Minimal right neural foraminal narrowing. No significant left neural foraminal narrowing. L3-L4: There is a disc bulge with a a right paracentral disc protrusion along with buckling of the ligamentum flavum and facet arthrosis. There is moderate spinal canal stenosis. There may be impingement upon the right L4 nerve root. Mild bilateral neural foraminal narrowing. L4-L5: There is a disc bulge with a right paracentral annular tear along with buckling of the ligamentum flavum and facet arthrosis. This appears to contact the L5 nerve roots bilaterally. Mild spinal canal stenosis. Mild-to-moderate bilateral neural foraminal narrowing. L5-S1: There is a disc bulge with a right foraminal disc protrusion along with bilateral facet arthrosis. This may impinge upon the right S1 nerve root within the subarticular/lateral recess. No significant spinal canal stenosis. Moderate right and mild-to-moderate left neural foraminal narrowing. 1. Moderate spinal canal stenosis at L3-L4 with mild stenosis at L4-L5. 2. There may be impingement upon the right L4 nerve root at L3-L4 as well as impingement of the right S1 nerve root at L5-S1. 3. A disc bulge at L4-L5 contacts the L5 nerve roots bilaterally. 4. Multilevel neural foraminal narrowing as above. 5. Minimal retrolisthesis at L5-S1. IMPRESSION/PLAN:  38 y/o with acute back pain after lifting injury likely related to annular tear at L4/5. No consistent radiculopathy and only mild compression on MRI. Discussed diagnosis with patient and wife. No indications or options for surgery at this time. Recommend medical management, if not improving consider epidural injections which will require transfer. Thank you again for this consultation.     Sharath House MD  1/14/2022

## 2022-01-14 NOTE — ED PROVIDER NOTES
7:32 AM: I discussed the history, physical examination, laboratory and imaging studies, and treatment plan with Dr. Dominik White. Alexandra Monroe was signed out to me in stable condition. Please see Dr. David Blas documentation for details of their history, physical, and laboratory studies. Upon re-examination, a summary of Jane Closs A Henson's history, physical examination, and studies are as follows:      Patient presents for a work-related injury after lifting a heavy object. Patient reports significant pain. Plan at the time of signout was to follow-up on MRI. If MRI was reassuring, patient could be given further pain control and have physical therapy evaluation. MRI LUMBAR SPINE WO CONTRAST   Final Result   1. Moderate spinal canal stenosis at L3-L4 with mild stenosis at L4-L5. 2. There may be impingement upon the right L4 nerve root at L3-L4 as well as   impingement of the right S1 nerve root at L5-S1.   3. A disc bulge at L4-L5 contacts the L5 nerve roots bilaterally. 4. Multilevel neural foraminal narrowing as above. 5. Minimal retrolisthesis at L5-S1. MRI shows some nerve impingement, but no evidence of epidural abscess, epidural hematoma, or other emergent pathologies. Patient reevaluated, he does report continued pain. He is noted to be moving his legs in the bed. We will have physical therapy evaluate. Physical therapy requested that I contact orthopedic spine prior to their evaluation. I did speak with Dr. Zhou Dimas of orthopedic spine who had no acute concerns about the imaging. He said this would typically be an outpatient management with steroids and pain control. Physical therapy evaluated the patient, they stated patient required inpatient physical therapy.   Patient admitted in stable condition     Baylee Franz MD  01/18/22 [id number]

## 2022-01-14 NOTE — CARE COORDINATION
Referred to patient for d/c planning. Spoke to patient. Patient is a 39year old male admitted for back injury. Patient reports he usually lives at home with wife who is RN and 2 children. Patient is usually independent in ADLs. Attempted to discuss inpatient rehab with patient. Patient currently declining to discuss. States he needs to see a doctor, possible steroid injection and to return home. MD updated. Referral to ARU in case patient decides for rehab. Will continue to follow for d/c needs.   Electronically signed by CAROLINA Leung, ROBBY on 1/14/2022 at 11:33 AM

## 2022-01-14 NOTE — ED PROVIDER NOTES
CHIEF COMPLAINT  Work Related Injury (pt was at work today he lifted a crate 400 lbs off of a semi-truck, pt back pain has worsened since then. pt denies any loss of bowel or bladder. denies numbness. pain shoots down both legs. )      HISTORY OF PRESENT ILLNESS  Cheng Torrez is a 39 y.o. male with a history of depression, hypertension who presents emergency department for evaluation of drape. Patient states that when he was at work earlier today, he lifted a crate that consisted of 200 to 400 pounds off of a semitruck. He states that he immediately at that time had sudden onset of low back pain. He states since that time, the back pain has worsened. He states that he does have some vague numbness that shoots down both of his legs as well as pain. He states that he is not able to move easily because of the pain. He denies any loss of bowel or bladder control. He denies any saddle anesthesia. According to EMS, he received 100 mcg fentanyl for pain control. He has no prior back injuries that he is aware of. No history of IV drug use.   Past Medical History:   Diagnosis Date    Depression     Hypertension     Sleep apnea     no sleep studies     Past Surgical History:   Procedure Laterality Date    HAND SURGERY      LAPAROSCOPIC APPENDECTOMY N/A 11-26-13    LAPAROSCOPIC APPENDECTOMY                 TONSILLECTOMY      WISDOM TOOTH EXTRACTION       Family History   Problem Relation Age of Onset    Diabetes Mother     High Blood Pressure Mother     High Cholesterol Mother     Diabetes Father     High Blood Pressure Father     High Cholesterol Father     Heart Disease Brother         aortic stenosis    Cancer Maternal Grandmother     Cancer Maternal Grandfather     Cancer Paternal Grandmother     Cancer Paternal Grandfather      Social History     Socioeconomic History    Marital status:      Spouse name: Not on file    Number of children: Not on file    Years of education: Not on file    Highest education level: Not on file   Occupational History    Not on file   Tobacco Use    Smoking status: Current Every Day Smoker     Packs/day: 0.50     Years: 11.00     Pack years: 5.50     Types: Cigarettes     Last attempt to quit: 12/15/2016     Years since quittin.0    Smokeless tobacco: Never Used    Tobacco comment: on chantix, trying to quit- 19   Vaping Use    Vaping Use: Never used   Substance and Sexual Activity    Alcohol use: Yes     Comment: occasional    Drug use: No    Sexual activity: Yes     Partners: Female   Other Topics Concern    Not on file   Social History Narrative    Not on file     Social Determinants of Health     Financial Resource Strain:     Difficulty of Paying Living Expenses: Not on file   Food Insecurity:     Worried About Running Out of Food in the Last Year: Not on file    Hieu of Food in the Last Year: Not on file   Transportation Needs:     Lack of Transportation (Medical): Not on file    Lack of Transportation (Non-Medical):  Not on file   Physical Activity:     Days of Exercise per Week: Not on file    Minutes of Exercise per Session: Not on file   Stress:     Feeling of Stress : Not on file   Social Connections:     Frequency of Communication with Friends and Family: Not on file    Frequency of Social Gatherings with Friends and Family: Not on file    Attends Anabaptism Services: Not on file    Active Member of [address] [place] [address] or Organizations: Not on file    Attends Club or Organization Meetings: Not on file    Marital Status: Not on file   Intimate Partner Violence:     Fear of Current or Ex-Partner: Not on file    Emotionally Abused: Not on file    Physically Abused: Not on file    Sexually Abused: Not on file   Housing Stability:     Unable to Pay for Housing in the Last Year: Not on file    Number of [place] in the Last Year: Not on file    Unstable Housing in the Last Year: Not on file     No current facility-administered medications for this encounter. Current Outpatient Medications   Medication Sig Dispense Refill    vitamin D (ERGOCALCIFEROL) 26122 units CAPS capsule Take 1 capsule by mouth once a week for 12 doses 12 capsule 0    varenicline (CHANTIX CONTINUING MONTH PAK) 1 MG tablet Take 1 tablet by mouth 2 times daily 60 tablet 1     No Known Allergies    REVIEW OF SYSTEMS  Positive and pertinent negatives as per HPI. All other systems were reviewed and are negative. PHYSICAL EXAM  /87   Pulse 87   Temp 98 °F (36.7 °C)   Resp 18   SpO2 100%   GENERAL APPEARANCE: Awake and alert. Cooperative. HEAD: Normocephalic. Atraumatic. HEART: RRR. No harsh murmurs. Intact radial pulses 2+ bilaterally. LUNGS: Respirations unlabored without accessory muscle use. Speaking comfortably in full sentences. ABDOMEN: Soft. Non-distended. Non-tender. No guarding or rebound. EXTREMITIES: No peripheral edema. No acute deformities. There is no midline lumbar spinal tenderness. There is elicited tenderness to palpation of the left lumbar paraspinal region. SKIN: Warm and dry. No acute rashes. NEUROLOGICAL: Alert and oriented X 3. Able to move the bilateral lower extremities, however, due to pain, patient is not able to participate in strength exam. Intact dorsiflexion and plantarflexion bilaterally. Intact sensation to light touch in the bilateral lower extremities      LABS  I have reviewed all labs for this visit.    Results for orders placed or performed during the hospital encounter of 01/14/22   CBC Auto Differential   Result Value Ref Range    WBC 6.4 4.0 - 11.0 K/uL    RBC 4.94 4.20 - 5.90 M/uL    Hemoglobin 14.6 13.5 - 17.5 g/dL    Hematocrit 42.7 40.5 - 52.5 %    MCV 86.4 80.0 - 100.0 fL    MCH 29.6 26.0 - 34.0 pg    MCHC 34.3 31.0 - 36.0 g/dL    RDW 13.6 12.4 - 15.4 %    Platelets 270 628 - 790 K/uL    MPV 7.1 5.0 - 10.5 fL    Neutrophils % 58.5 %    Lymphocytes % 30.6 %    Monocytes % 7.7 %    Eosinophils % 2.6 %    Basophils % 0.6 %    Neutrophils Absolute 3.8 1.7 - 7.7 K/uL    Lymphocytes Absolute 2.0 1.0 - 5.1 K/uL    Monocytes Absolute 0.5 0.0 - 1.3 K/uL    Eosinophils Absolute 0.2 0.0 - 0.6 K/uL    Basophils Absolute 0.0 0.0 - 0.2 K/uL   Basic Metabolic Panel w/ Reflex to MG   Result Value Ref Range    Sodium 141 136 - 145 mmol/L    Potassium reflex Magnesium 4.1 3.5 - 5.1 mmol/L    Chloride 103 99 - 110 mmol/L    CO2 29 21 - 32 mmol/L    Anion Gap 9 3 - 16    Glucose 100 (H) 70 - 99 mg/dL    BUN 21 (H) 7 - 20 mg/dL    CREATININE 0.8 (L) 0.9 - 1.3 mg/dL    GFR Non-African American >60 >60    GFR African American >60 >60    Calcium 8.9 8.3 - 10.6 mg/dL           RADIOLOGY  X-RAYS:  I have reviewed radiologic plain film image(s). ALL OTHER NON-PLAIN FILM IMAGES SUCH AS CT, ULTRASOUND AND MRI HAVE BEEN READ BY THE RADIOLOGIST. MRI LUMBAR SPINE WO CONTRAST    (Results Pending)            ED COURSE/MDM  Patient seen and evaluated. Patient presenting with midline low back pain, bilateral leg numbness, pain. Strength exam in the bilateral lower extremities is limited secondary to pain and patient cooperation. He has intact sensation to light touch, dorsi flexion and plantar flexion of the lower extremities. He has no saddle anesthesia however because he has bilateral subjective numbness, pain in the bilateral lower extremities with injury, MRI of the lumbar spine will be ordered to rule out spinal epidural compression syndrome. He will be given Valium while diagnostic work-up is pending. If MRI does not reveal spinal epidural compression syndrome, he will likely require PT OT evaluation prior to discharge home. CLINICAL IMPRESSION  1. Acute left-sided low back pain with bilateral sciatica        Blood pressure 122/87, pulse 87, temperature 98 °F (36.7 °C), resp. rate 18, SpO2 100 %.     DISPOSITION  Nava Anger was signed out pending results of MRI spine    This chart was generated in part by using [employer] system and may contain errors related to that system including errors in grammar, punctuation, and spelling, as well as words and phrases that may be inappropriate. If there are any questions or concerns please feel free to contact the dictating provider for clarification.      Yang Abel MD  01/14/22 8632

## 2022-01-14 NOTE — ED NOTES
Patient resting comfortably in bed. MRI screening form filled out. Patient medicated per eMAR. Placed in gown.       Essie Villalpando RN  01/14/22 3281

## 2022-01-14 NOTE — CONSULTS
@0570 called Orthopedic Surgery, per Dr. José Antonio Curtis   RE: back pain with nerve impingement  @[phone] Angela returned call and spoke to Dr. José Antonio Curtis

## 2022-01-14 NOTE — CARE COORDINATION
[facility] - Acute Rehab Unit   After review, this patient is felt to be:       []  Appropriate for Acute Inpatient Rehab    []  Appropriate for Acute Inpatient Rehab Pending Insurance Authorization    []  Not appropriate for Acute Inpatient Rehab    [x]  Referral received and ARU reviewing patient; Evaluation ongoing, await OT evaluation and patient agreeing to ARU to determine appropriateness. Will notify DCP with further updates.  Thank you for the referral.  Ashley Wells RN

## 2022-01-14 NOTE — PROGRESS NOTES
Physical Therapy    Facility/Department: [address] [place] [address]  ED  Initial Assessment    NAME: Ilene Murphy  : 1985  MRN: [id number]    Date of Service: 2022    Discharge Recommendations:  IP Rehab   PT Equipment Recommendations  Equipment Needed: No  Other: defer  If pt is unable to be seen after this session, please let this note serve as discharge summary. Please see case management note for discharge disposition. Thank you. Assessment   Body structures, Functions, Activity limitations: Decreased functional mobility ; Increased pain;Decreased ADL status; Decreased strength;Decreased endurance  Assessment: Pt functioning below baseline after an injury at work causing back pain. Per MRI with pwith disc bulge at L4-L5 contacts the L5 nerve roots bilaterally as well as impingment. Pt with significant pain with mobilty. Pt was able to log roll to the L with Min A and transfer to sit with Mod A. Able to stand wtih mod a to a RW and shuffle a couple steps forward with significant. Pt assisted back to bed. Due to severe pain limiting mobility, recommend IPR to return to OF. Treatment Diagnosis: decreased mobility  Prognosis: Good  Decision Making: Medium Complexity  PT Education: Goals; Disease Specific Education;PT Role;Functional Mobility Training;Transfer Training; Injury Prevention  Patient Education: Pt expressed understanding on role of PT to assess and facilitate mobility  Barriers to Learning: none  REQUIRES PT FOLLOW UP: Yes  Activity Tolerance  Activity Tolerance: Patient limited by pain       Patient Diagnosis(es): The encounter diagnosis was Acute left-sided low back pain with bilateral sciatica. has a past medical history of Depression, Hypertension, and Sleep apnea. has a past surgical history that includes Tonsillectomy; [place] tooth extraction; Hand surgery; and laparoscopic appendectomy (N/A, 13).     Restrictions  Restrictions/Precautions  Restrictions/Precautions: Fall Risk  Vision/Hearing  Vision: Within Functional Limits  Hearing: Within functional limits     Subjective  General  Chart Reviewed: Yes  Patient assessed for rehabilitation services?: Yes  Response To Previous Treatment: Not applicable  Family / Caregiver Present: No  Referring Practitioner: Chiquita Coats MD  Referral Date : 01/14/22  Diagnosis: back pain  Follows Commands: Within Functional Limits  General Comment  Comments: cleared by nursing  Subjective  Subjective: pt resting in bed. reports 2/10 pain at rest. 8/10 with mobility  Vital Signs  Pulse: 87  BP: 125/78  Oxygen Therapy  SpO2: 98 %       Orientation  Orientation  Overall Orientation Status: Within Normal Limits  Social/Functional History  Social/Functional History  Lives With: Spouse (18 yo, 3 mo)  Type of Home: House  Home Layout: Two level,Bed/Bath upstairs,1/2 bath on main level  Home Access: Stairs to enter with rails  Entrance Stairs - Number of Steps: 6  Bathroom Shower/Tub: Tub/Shower unit,Walk-in shower  ADL Assistance: [address] [place] [address] Avenue: Independent  Homemaking Responsibilities: Yes  Ambulation Assistance: Independent  Transfer Assistance: Independent  Active : Yes  Occupation: Full time employment  Cognition        Objective          PROM RLE (degrees)  RLE PROM: WFL  AROM RLE (degrees)  RLE AROM: WFL  PROM LLE (degrees)  LLE PROM: WFL  AROM LLE (degrees)  LLE AROM : WFL  Strength RLE  Strength RLE: WFL  Strength LLE  Strength LLE: WFL  Strength RUE  Strength RUE: WFL  Strength LUE  Strength LUE: WFL  Tone RLE  RLE Tone: Normotonic  Tone LLE  LLE Tone: Normotonic  Sensation  Overall Sensation Status: WFL  Bed mobility  Rolling to Left: Minimal assistance  Supine to Sit: Moderate assistance (via log roll)  Sit to Supine: Moderate assistance  Transfers  Sit to Stand:  Moderate Assistance  Stand to sit: Minimal Assistance  Ambulation  Ambulation?: Yes  More Ambulation?: No  Ambulation 1  Surface: level tile  Device: [address]: Minimal assistance  Quality of Gait: antalgic gait with max support through UE to relief pain. Unable to clear foot from floor  Gait Deviations: Shuffles; Slow Yolie  Distance: 2' forward and back  Stairs/Curb  Stairs?: No     Balance  Posture: Good  Sitting - Static: Good  Sitting - Dynamic: Good  Standing - Static: Fair  Standing - Dynamic: [address]  Times per week: 3-5x/week  Current Treatment Recommendations: Functional Mobility Training,Transfer Training,Equipment Evaluation, Education, & procurement,Safety Education & Training,Home Exercise Program  Safety Devices  Type of devices:  All fall risk precautions in place,Bed alarm in place,Gait belt,Patient at risk for falls,Left in bed,Nurse notified  Restraints  Initially in place: No      AM-PAC Score  AM-PAC Inpatient Mobility Raw Score : 11 (01/14/22 1045)  AM-PAC Inpatient T-Scale Score : 33.86 (01/14/22 1045)  Mobility Inpatient CMS 0-100% Score: 72.57 (01/14/22 1045)  Mobility Inpatient CMS G-Code Modifier : CL (01/14/22 1045)          Goals  Short term goals  Time Frame for Short term goals: 1/20  Short term goal 1: Pt will complete bed mobility with SBA  Short term goal 2: Pt will ambulate with LRAD x 50' with supervision  Short term goal 3: Pt will complete core strengthening exercises to improve pain by 1/17  Patient Goals   Patient goals : to be able to walk       Therapy Time   Individual Concurrent Group Co-treatment   Time In 0943         Time Out 1025         Minutes 42         Timed Code Treatment Minutes: [address], PT

## 2022-01-15 PROCEDURE — 97166 OT EVAL MOD COMPLEX 45 MIN: CPT

## 2022-01-15 PROCEDURE — 97530 THERAPEUTIC ACTIVITIES: CPT

## 2022-01-15 PROCEDURE — 6370000000 HC RX 637 (ALT 250 FOR IP): Performed by: PHYSICIAN ASSISTANT

## 2022-01-15 PROCEDURE — 96372 THER/PROPH/DIAG INJ SC/IM: CPT

## 2022-01-15 PROCEDURE — 6370000000 HC RX 637 (ALT 250 FOR IP): Performed by: INTERNAL MEDICINE

## 2022-01-15 PROCEDURE — 6360000002 HC RX W HCPCS: Performed by: NEUROLOGICAL SURGERY

## 2022-01-15 PROCEDURE — 6370000000 HC RX 637 (ALT 250 FOR IP): Performed by: NEUROLOGICAL SURGERY

## 2022-01-15 PROCEDURE — G0378 HOSPITAL OBSERVATION PER HR: HCPCS

## 2022-01-15 PROCEDURE — 96376 TX/PRO/DX INJ SAME DRUG ADON: CPT

## 2022-01-15 PROCEDURE — 6360000002 HC RX W HCPCS: Performed by: PHYSICIAN ASSISTANT

## 2022-01-15 PROCEDURE — 2580000003 HC RX 258: Performed by: PHYSICIAN ASSISTANT

## 2022-01-15 PROCEDURE — 97535 SELF CARE MNGMENT TRAINING: CPT

## 2022-01-15 RX ORDER — IBUPROFEN 400 MG/1
800 TABLET ORAL EVERY 6 HOURS PRN
Status: DISCONTINUED | OUTPATIENT
Start: 2022-01-15 | End: 2022-01-16 | Stop reason: HOSPADM

## 2022-01-15 RX ADMIN — DEXAMETHASONE SODIUM PHOSPHATE 4 MG: 4 INJECTION, SOLUTION INTRAMUSCULAR; INTRAVENOUS at 11:55

## 2022-01-15 RX ADMIN — HYDROCODONE BITARTRATE AND ACETAMINOPHEN 1 TABLET: 5; 325 TABLET ORAL at 18:47

## 2022-01-15 RX ADMIN — Medication 10 ML: at 05:48

## 2022-01-15 RX ADMIN — ENOXAPARIN SODIUM 40 MG: 40 INJECTION SUBCUTANEOUS at 08:40

## 2022-01-15 RX ADMIN — GABAPENTIN 300 MG: 300 CAPSULE ORAL at 14:35

## 2022-01-15 RX ADMIN — POLYETHYLENE GLYCOL 3350 17 G: 17 POWDER, FOR SOLUTION ORAL at 14:41

## 2022-01-15 RX ADMIN — DEXAMETHASONE SODIUM PHOSPHATE 4 MG: 4 INJECTION, SOLUTION INTRAMUSCULAR; INTRAVENOUS at 23:06

## 2022-01-15 RX ADMIN — IBUPROFEN 800 MG: 400 TABLET, FILM COATED ORAL at 18:47

## 2022-01-15 RX ADMIN — DEXAMETHASONE SODIUM PHOSPHATE 4 MG: 4 INJECTION, SOLUTION INTRAMUSCULAR; INTRAVENOUS at 05:48

## 2022-01-15 RX ADMIN — GABAPENTIN 300 MG: 300 CAPSULE ORAL at 08:40

## 2022-01-15 RX ADMIN — IBUPROFEN 800 MG: 400 TABLET, FILM COATED ORAL at 12:35

## 2022-01-15 RX ADMIN — GABAPENTIN 300 MG: 300 CAPSULE ORAL at 20:14

## 2022-01-15 RX ADMIN — METHOCARBAMOL 1000 MG: 500 TABLET ORAL at 20:14

## 2022-01-15 RX ADMIN — KETOROLAC TROMETHAMINE 30 MG: 30 INJECTION, SOLUTION INTRAMUSCULAR at 05:48

## 2022-01-15 RX ADMIN — Medication 10 ML: at 20:14

## 2022-01-15 RX ADMIN — Medication 10 ML: at 08:40

## 2022-01-15 RX ADMIN — ACETAMINOPHEN 650 MG: 325 TABLET ORAL at 08:39

## 2022-01-15 RX ADMIN — METHOCARBAMOL 1000 MG: 500 TABLET ORAL at 08:39

## 2022-01-15 RX ADMIN — METHOCARBAMOL 1000 MG: 500 TABLET ORAL at 14:35

## 2022-01-15 RX ADMIN — HYDROCODONE BITARTRATE AND ACETAMINOPHEN 1 TABLET: 5; 325 TABLET ORAL at 12:03

## 2022-01-15 RX ADMIN — DEXAMETHASONE SODIUM PHOSPHATE 4 MG: 4 INJECTION, SOLUTION INTRAMUSCULAR; INTRAVENOUS at 18:43

## 2022-01-15 RX ADMIN — HYDROCODONE BITARTRATE AND ACETAMINOPHEN 1 TABLET: 5; 325 TABLET ORAL at 06:37

## 2022-01-15 RX ADMIN — HYDROCODONE BITARTRATE AND ACETAMINOPHEN 1 TABLET: 5; 325 TABLET ORAL at 23:07

## 2022-01-15 ASSESSMENT — PAIN SCALES - GENERAL
PAINLEVEL_OUTOF10: 7
PAINLEVEL_OUTOF10: 9
PAINLEVEL_OUTOF10: 9
PAINLEVEL_OUTOF10: 2
PAINLEVEL_OUTOF10: 8
PAINLEVEL_OUTOF10: 8
PAINLEVEL_OUTOF10: 7
PAINLEVEL_OUTOF10: 8

## 2022-01-15 ASSESSMENT — PAIN DESCRIPTION - LOCATION
LOCATION: BACK

## 2022-01-15 NOTE — PROGRESS NOTES
Occupational Therapy   Occupational Therapy Initial Assessment/Treatment  Date: 1/15/2022   Patient Name: Nava Hardy  MRN: [id number]     : 1985    Date of Service: 1/15/2022    Discharge Recommendations:  Continue to assess pending progress (IPR vs Home with 24hr A and HHOT/OPOT)  OT Equipment Recommendations  Equipment Needed: Yes  Mobility Devices: ADL Assistive Devices; Taffy Maid: Rolling  ADL Assistive Devices: Reacher;Sock-Aid Hard;Long-handled Sponge    Assessment   Performance deficits / Impairments: Decreased functional mobility ; Decreased ADL status; Decreased endurance;Decreased balance    Assessment: Pt is a 46yo male with deficits in the areas listed above after Herniated disc at L4-L5 and Right L4 nerve root impingement. Pt is (I) and works as a director of opperations at baseline (does not typically lift heavy loads). Today, pt required min-mod A with bed mobility and vc's for log rolling. Pt performing LB ADL of donning socks with total A d/t pain. Pt performing functional mobility/t/f with RW and CGA with pt placing most of his weight through his arms to prevent increased pressure on his spine. Pt experiencing increased fatigue/SOB with mobility d/t increase need to use arms. Pt would continue to benefit from skilled OT services in acute care. CTA d/c recommendation IPR vs Home with 24hr A and HHOT/OPOT. Prognosis: Good  Decision Making: Medium Complexity  OT Education: OT Role;Plan of Care;ADL Adaptive Strategies;Transfer Training;Precautions; Family Education  Patient Education: disease specific: log rolling, max education on d/c possibilities, role of OT, purpose of evaluation, RW use, safe t/fs, general safety during hospitalization. Pt and wife verbalized understanding. REQUIRES OT FOLLOW UP: Yes  Activity Tolerance  Activity Tolerance: Patient limited by pain; Patient limited by fatigue;Patient Tolerated treatment well  Activity Tolerance: 119/78, , RAF O2.  Pt experiencing increased pain with mobility this date requiring increased use of RW, assistance and increased time to complete activities. Pt experiencing increased UE fatigue during mobility d/t pt using UE with mobility more. Safety Devices  Safety Devices in place: Yes  Type of devices: Nurse notified;Gait belt;Bed alarm in place;Call light within reach; Left in bed           Patient Diagnosis(es): The encounter diagnosis was Acute left-sided low back pain with bilateral sciatica. has a past medical history of Depression, Hypertension, and Sleep apnea. has a past surgical history that includes Tonsillectomy; [place] tooth extraction; Hand surgery; and laparoscopic appendectomy (N/A, 11-26-13). Restrictions  Restrictions/Precautions  Restrictions/Precautions: Fall Risk  Position Activity Restriction  Other position/activity restrictions: up with assist    Subjective   General  Chart Reviewed: Yes,Orders,Progress Notes,History and Physical,Labs  Patient assessed for rehabilitation services?: Yes  Response to previous treatment: Patient with no complaints from previous session  Family / Caregiver Present: Yes  Referring Practitioner: ROCÍO Le  Diagnosis: Right L4 nerve root impingement, Disc bulge at L4-L5 with effacement on the L5 nerve root  Subjective  Subjective: Pt in bed and agreeable to therapy. General Comment  Comments: RN approved therapy.   Patient Currently in Pain: Yes  Pain Assessment  Pain Level: 2  Pain Location: Back (lower back.)  Response to Pain Intervention: Patient Satisfied (rest, repositioned.)  Vital Signs  Patient Currently in Pain: Yes  Social/Functional History  Social/Functional History  Lives With: Spouse (18 yo, 3 mo)  Type of Home: House  Home Layout: 1/2 bath on main level,Multi-level (Pt bedroom in basement, and full bath in basement.)  Home Access: Stairs to enter with rails  Entrance Stairs - Number of Steps: 6  Bathroom Shower/Tub: Tub/Shower unit,Walk-in shower (Pt uses walk in shower.)  Bathroom Toilet: Standard  Bathroom Equipment:  (no DME.)  ADL Assistance: Independent  Homemaking Assistance: Independent  Homemaking Responsibilities: Yes  Ambulation Assistance: Independent  Transfer Assistance: Independent  Active : Yes  Occupation: Full time employment  Additional Comments: Pt report no falls in the past 6months. Objective   Vision: Within Functional Limits  Hearing: Within functional limits    Orientation  Overall Orientation Status: Within Normal Limits  Observation/Palpation  Posture: Good  Body Mechanics: Pt supporting self with arms in seated and with RW to take pressure off of back. Balance  Sitting Balance: Stand by assistance  Standing Balance: Contact guard assistance  Standing Balance  Time: ~2min  Activity: in room functional mobility to prepare for household distances. Comment: RW used. Increased pain (5/10) with mobility and pressure on spine. Functional Mobility  Functional - Mobility Device: Rolling Walker  Activity: Other (in room functional mobility to prepare for household distances.)  Assist Level: Contact guard assistance  Functional Mobility Comments: Pt becoming fatigued and SOB during functional mobility d/t increased pain and use of arms to support majority of body weight. ADL  LE Dressing: Dependent/Total (to don socks)  Additional Comments: Pt requiring increased assistance d/t pain. Tone RUE  RUE Tone: Normotonic  Tone LUE  LUE Tone: Normotonic     Bed mobility  Supine to Sit: Moderate assistance  Sit to Supine: Minimal assistance  Comment: Pt log rolling HOB flat. Vc's required for log rolling. Transfers  Sit to stand: Contact guard assistance  Stand to sit: Contact guard assistance  Transfer Comments: RW used and vc's for safe t/fs.      Cognition  Overall Cognitive Status: WFL        Sensation  Overall Sensation Status: [facility]                               Plan   Plan  Times per week: 3-5x/week  Current Treatment Recommendations: Balance Training,Functional Mobility Training,Strengthening,Endurance Training,Stair training,Safety Education & Training,Patient/Caregiver Education & Training,Equipment Evaluation, Education, & procurement,Self-Care / ADL,Home Management Training    AM-PAC Score        AM-PAC Inpatient Daily Activity Raw Score: 16 (01/15/22 1644)  AM-PAC Inpatient ADL T-Scale Score : 35.96 (01/15/22 1644)  ADL Inpatient CMS 0-100% Score: 53.32 (01/15/22 1644)  ADL Inpatient CMS G-Code Modifier : CK (01/15/22 1644)    Goals  Short term goals  Time Frame for Short term goals: 1 week (1/22) unless stated otherwise. Short term goal 1: Pt will LBD with min A and AE PRN. Short term goal 2: Pt will perform at least 3min of standing ADLs with supervision and AD PRN. Short term goal 3: Pt will toilet with supervision and AD PRN. Short term goal 4: Pt will perform BUE ther ex x20 to increase strength for functional mobility and ADLS (5/60). Short term goal 5: Pt will LB bathe with supervision and AE PRN. Patient Goals   Patient goals : \"to be able to live my life\", \"to not be stuck on the couch for 4 months\"       Therapy Time   Individual Concurrent Group Co-treatment   Time In 1532         Time Out 1612         Minutes 40         Timed Code Treatment Minutes: 30 Minutes (10min eval)       ELI La/L  If pt discharges prior to next session, this note will serve as discharge summary. See case management note for discharge disposition.

## 2022-01-15 NOTE — PROGRESS NOTES
Spoke with PT. PT states they need order for OT for ARU as well. PT to attempt to re-evaluate pt today if schedule allows. See new orders for OT.

## 2022-01-15 NOTE — PROGRESS NOTES
Hospitalist Progress Note      PCP: Lacho Lowery    Date of Admission: 1/14/2022    Chief Complaint:   Back pain/injury at work    Hospital Course:    39 y.o. male who presented to [facility] with with a history of depression, hypertension with complaints of back pain after a work injury. Patient states that he was helping lift a crate that was 200 to 400 pounds off a truck when he felt like his back gave out and he had immediate low back pain. He states he is able to return home and tried to bear the pain but reports the pain worsened at home and he noticed his ability to walk decreased. He states the pain does radiate down both of his legs. He describes the pain as sharp and feels like a knife is twisting when he moves. He rates his pain as a 7 out of 10 after receiving pain medicine but a 10 out of 10 upon presentation. He denies saddle anesthesia, loss of bowel or bladder control, shortness of breath, dysuria, lightheaded or dizziness, abdominal pain, N/V/D, or weakness in his arms or legs. Subjective:   Patient seen resting in bed today states his pain is much more manageable. States he is able to get up and go to the bathroom on his own. Is considering inpatient rehab versus outpatient physical therapy.   No new complaints at this time      Medications:  Reviewed    Infusion Medications    sodium chloride       Scheduled Medications    sodium chloride flush  5-40 mL IntraVENous 2 times per day    enoxaparin  40 mg SubCUTAneous Daily    methocarbamol  1,000 mg Oral TID    nicotine  1 patch TransDERmal Daily    lidocaine  1 patch TransDERmal Daily    dexamethasone  4 mg IntraVENous Q6H    gabapentin  300 mg Oral TID     PRN Meds: sodium chloride flush, sodium chloride, ondansetron **OR** ondansetron, polyethylene glycol, acetaminophen **OR** acetaminophen, ketorolac, HYDROcodone 5 mg - acetaminophen      Intake/Output Summary (Last 24 hours) at 1/15/2022 3924  Last data filed at 1/15/2022 0550  Gross per 24 hour   Intake --   Output 1650 ml   Net -1650 ml       Physical Exam Performed:    /72   Pulse 74   Temp 97.3 °F (36.3 °C) (Oral)   Resp 18   Ht 6' 1\" (1.854 m)   Wt 215 lb (97.5 kg)   SpO2 96%   BMI 28.37 kg/m²     General appearance: No apparent distress, appears stated age and cooperative. HEENT: Pupils equal, round, and reactive to light. Conjunctivae/corneas clear. Neck: Supple, with full range of motion. No jugular venous distention. Trachea midline. Respiratory:  Normal respiratory effort. Clear to auscultation, bilaterally without Rales/Wheezes/Rhonchi. Cardiovascular: Regular rate and rhythm with normal S1/S2 without murmurs, rubs or gallops. Abdomen: Soft, non-tender, non-distended with normal bowel sounds. Musculoskeletal: Lumbar paraspinal muscle tender to palpation no step-offs or deformities decreased PROM and AROM bilateral lower extremity secondary to pain sensation intact no clubbing, cyanosis or edema bilaterally. Full range of motion without deformity. Skin: Skin color, texture, turgor normal.  No rashes or lesions. Neurologic:  Neurovascularly intact without any focal sensory/motor deficits. Cranial nerves: II-XII intact, grossly non-focal.  Psychiatric: Alert and oriented, thought content appropriate, normal insight  Capillary Refill: Brisk,3 seconds, normal   Peripheral Pulses: +2 palpable, equal bilaterally       Labs:   Recent Labs     01/14/22  0354   WBC 6.4   HGB 14.6   HCT 42.7        Recent Labs     01/14/22  0354      K 4.1      CO2 29   BUN 21*   CREATININE 0.8*   CALCIUM 8.9     No results for input(s): AST, ALT, BILIDIR, BILITOT, ALKPHOS in the last 72 hours. No results for input(s): INR in the last 72 hours. No results for input(s): Cira Peek in the last 72 hours.     Urinalysis:      Lab Results   Component Value Date    NITRU Negative 11/26/2013    BLOODU TRACE-INTACT 11/26/2013    SPECGRAV 1.025 11/26/2013    GLUCOSEU Negative 11/26/2013       Radiology:  MRI LUMBAR SPINE WO CONTRAST   Final Result   1. Moderate spinal canal stenosis at L3-L4 with mild stenosis at L4-L5. 2. There may be impingement upon the right L4 nerve root at L3-L4 as well as   impingement of the right S1 nerve root at L5-S1.   3. A disc bulge at L4-L5 contacts the L5 nerve roots bilaterally. 4. Multilevel neural foraminal narrowing as above. 5. Minimal retrolisthesis at L5-S1. Assessment/Plan:    Active Hospital Problems    Diagnosis     Back pain [M54.9]      Herniated disc at L4-L5  Right L4 nerve root impingement  - Orthopedic spine, consulted appreciate recs, orthopedic surgery not able to see neurosurgery so I will recommend no surgery at this time recommend therapy and will evaluate further for epidural injection in the future. - Pain control with lidocaine patches, Robaxin, ibuprofen, hydrocodone as needed  - Up with assistance  - PT/OT evaluation ARU consulted for potential IPR    DVT Prophylaxis: Lovenox  Diet: ADULT DIET;  Regular  Code Status: Full Code    PT/OT Eval Status: PT recommending ARU OT evaluation pending    Dispo -possibly tomorrow    ROCÍO Phelan

## 2022-01-15 NOTE — PROGRESS NOTES
Pagekirstie Tripathi MD: \"Pt thought he would be switched from IV Decadron to PO steriods today. Are you able to switch these? \"      Response from Dexter Dominique MD: Wait until morning rounds.

## 2022-01-16 VITALS
DIASTOLIC BLOOD PRESSURE: 67 MMHG | BODY MASS INDEX: 28.49 KG/M2 | WEIGHT: 215 LBS | HEIGHT: 73 IN | RESPIRATION RATE: 16 BRPM | HEART RATE: 87 BPM | TEMPERATURE: 98.2 F | SYSTOLIC BLOOD PRESSURE: 114 MMHG | OXYGEN SATURATION: 95 %

## 2022-01-16 PROCEDURE — 96372 THER/PROPH/DIAG INJ SC/IM: CPT

## 2022-01-16 PROCEDURE — 6370000000 HC RX 637 (ALT 250 FOR IP): Performed by: PHYSICIAN ASSISTANT

## 2022-01-16 PROCEDURE — 6370000000 HC RX 637 (ALT 250 FOR IP): Performed by: INTERNAL MEDICINE

## 2022-01-16 PROCEDURE — 6360000002 HC RX W HCPCS: Performed by: NEUROLOGICAL SURGERY

## 2022-01-16 PROCEDURE — 6360000002 HC RX W HCPCS: Performed by: PHYSICIAN ASSISTANT

## 2022-01-16 PROCEDURE — G0378 HOSPITAL OBSERVATION PER HR: HCPCS

## 2022-01-16 PROCEDURE — 6370000000 HC RX 637 (ALT 250 FOR IP): Performed by: NEUROLOGICAL SURGERY

## 2022-01-16 PROCEDURE — 96376 TX/PRO/DX INJ SAME DRUG ADON: CPT

## 2022-01-16 RX ORDER — DEXAMETHASONE SODIUM PHOSPHATE 4 MG/ML
4 INJECTION, SOLUTION INTRA-ARTICULAR; INTRALESIONAL; INTRAMUSCULAR; INTRAVENOUS; SOFT TISSUE EVERY 6 HOURS
Status: COMPLETED | OUTPATIENT
Start: 2022-01-16 | End: 2022-01-16

## 2022-01-16 RX ORDER — SENNA PLUS 8.6 MG/1
1 TABLET ORAL 2 TIMES DAILY
Qty: 60 TABLET | Refills: 11 | Status: SHIPPED | OUTPATIENT
Start: 2022-01-16 | End: 2023-01-16

## 2022-01-16 RX ORDER — GABAPENTIN 300 MG/1
300 CAPSULE ORAL 3 TIMES DAILY
Qty: 21 CAPSULE | Refills: 0 | Status: SHIPPED | OUTPATIENT
Start: 2022-01-16 | End: 2022-01-23

## 2022-01-16 RX ORDER — IBUPROFEN 800 MG/1
800 TABLET ORAL EVERY 6 HOURS PRN
Qty: 120 TABLET | Refills: 1 | Status: SHIPPED | OUTPATIENT
Start: 2022-01-16

## 2022-01-16 RX ORDER — POLYETHYLENE GLYCOL 3350 17 G/17G
17 POWDER, FOR SOLUTION ORAL DAILY PRN
Qty: 527 G | Refills: 1 | Status: SHIPPED | OUTPATIENT
Start: 2022-01-16 | End: 2022-01-16

## 2022-01-16 RX ORDER — POLYETHYLENE GLYCOL 3350 17 G/17G
17 POWDER, FOR SOLUTION ORAL DAILY PRN
Qty: 527 G | Refills: 1 | Status: SHIPPED | OUTPATIENT
Start: 2022-01-16 | End: 2022-02-15

## 2022-01-16 RX ORDER — HYDROCODONE BITARTRATE AND ACETAMINOPHEN 5; 325 MG/1; MG/1
1 TABLET ORAL EVERY 6 HOURS PRN
Qty: 12 TABLET | Refills: 0 | Status: SHIPPED | OUTPATIENT
Start: 2022-01-16 | End: 2022-01-16

## 2022-01-16 RX ORDER — HYDROCODONE BITARTRATE AND ACETAMINOPHEN 5; 325 MG/1; MG/1
1 TABLET ORAL EVERY 6 HOURS PRN
Qty: 12 TABLET | Refills: 0 | Status: SHIPPED | OUTPATIENT
Start: 2022-01-16 | End: 2022-01-19

## 2022-01-16 RX ORDER — METHOCARBAMOL 500 MG/1
1000 TABLET, FILM COATED ORAL 3 TIMES DAILY
Qty: 126 TABLET | Refills: 0 | Status: SHIPPED | OUTPATIENT
Start: 2022-01-16 | End: 2022-02-06

## 2022-01-16 RX ORDER — NICOTINE 21 MG/24HR
1 PATCH, TRANSDERMAL 24 HOURS TRANSDERMAL DAILY
Qty: 30 PATCH | Refills: 3 | Status: SHIPPED | OUTPATIENT
Start: 2022-01-17

## 2022-01-16 RX ORDER — METHOCARBAMOL 500 MG/1
1000 TABLET, FILM COATED ORAL 3 TIMES DAILY
Qty: 126 TABLET | Refills: 0 | Status: SHIPPED | OUTPATIENT
Start: 2022-01-16 | End: 2022-01-16

## 2022-01-16 RX ORDER — IBUPROFEN 800 MG/1
800 TABLET ORAL EVERY 6 HOURS PRN
Qty: 120 TABLET | Refills: 1 | Status: SHIPPED | OUTPATIENT
Start: 2022-01-16 | End: 2022-01-16

## 2022-01-16 RX ORDER — SENNA PLUS 8.6 MG/1
1 TABLET ORAL 2 TIMES DAILY
Qty: 60 TABLET | Refills: 11 | Status: SHIPPED | OUTPATIENT
Start: 2022-01-16 | End: 2022-01-16 | Stop reason: SDUPTHER

## 2022-01-16 RX ORDER — DEXAMETHASONE 4 MG/1
4 TABLET ORAL EVERY 4 HOURS
Qty: 30 TABLET | Refills: 0 | Status: SHIPPED | OUTPATIENT
Start: 2022-01-16 | End: 2022-01-21

## 2022-01-16 RX ORDER — NICOTINE 21 MG/24HR
1 PATCH, TRANSDERMAL 24 HOURS TRANSDERMAL DAILY
Qty: 30 PATCH | Refills: 3 | Status: SHIPPED | OUTPATIENT
Start: 2022-01-17 | End: 2022-01-16

## 2022-01-16 RX ORDER — DEXAMETHASONE 4 MG/1
4 TABLET ORAL EVERY 4 HOURS
Qty: 30 TABLET | Refills: 0 | Status: SHIPPED | OUTPATIENT
Start: 2022-01-16 | End: 2022-01-16 | Stop reason: SDUPTHER

## 2022-01-16 RX ORDER — GABAPENTIN 300 MG/1
300 CAPSULE ORAL 3 TIMES DAILY
Qty: 21 CAPSULE | Refills: 0 | Status: SHIPPED | OUTPATIENT
Start: 2022-01-16 | End: 2022-01-16

## 2022-01-16 RX ADMIN — ACETAMINOPHEN 650 MG: 325 TABLET ORAL at 08:33

## 2022-01-16 RX ADMIN — IBUPROFEN 800 MG: 400 TABLET, FILM COATED ORAL at 00:23

## 2022-01-16 RX ADMIN — GABAPENTIN 300 MG: 300 CAPSULE ORAL at 08:33

## 2022-01-16 RX ADMIN — IBUPROFEN 800 MG: 400 TABLET, FILM COATED ORAL at 06:53

## 2022-01-16 RX ADMIN — HYDROCODONE BITARTRATE AND ACETAMINOPHEN 1 TABLET: 5; 325 TABLET ORAL at 05:11

## 2022-01-16 RX ADMIN — DEXAMETHASONE SODIUM PHOSPHATE 4 MG: 4 INJECTION, SOLUTION INTRAMUSCULAR; INTRAVENOUS at 05:11

## 2022-01-16 RX ADMIN — METHOCARBAMOL 1000 MG: 500 TABLET ORAL at 13:08

## 2022-01-16 RX ADMIN — ENOXAPARIN SODIUM 40 MG: 40 INJECTION SUBCUTANEOUS at 08:34

## 2022-01-16 RX ADMIN — METHOCARBAMOL 1000 MG: 500 TABLET ORAL at 08:33

## 2022-01-16 RX ADMIN — GABAPENTIN 300 MG: 300 CAPSULE ORAL at 13:08

## 2022-01-16 RX ADMIN — POLYETHYLENE GLYCOL 3350 17 G: 17 POWDER, FOR SOLUTION ORAL at 10:13

## 2022-01-16 RX ADMIN — IBUPROFEN 800 MG: 400 TABLET, FILM COATED ORAL at 13:08

## 2022-01-16 RX ADMIN — DEXAMETHASONE SODIUM PHOSPHATE 4 MG: 4 INJECTION, SOLUTION INTRAMUSCULAR; INTRAVENOUS at 13:07

## 2022-01-16 ASSESSMENT — PAIN SCALES - GENERAL
PAINLEVEL_OUTOF10: 4
PAINLEVEL_OUTOF10: 8
PAINLEVEL_OUTOF10: 4
PAINLEVEL_OUTOF10: 8

## 2022-01-16 NOTE — CARE COORDINATION
Pt rec'd for ARU- prefers home with [facility] referral. Feels he may not be able to drive or have a  for OP PT therefore is homebound. Left vm with [facility]. Addendum: [facility] can accept- will connect with Aleta DOMINGUEZ tomorrow re: C9 etc. Orders and ecoc faxed. Pt has ALBERTO phone number for contact.

## 2022-01-16 NOTE — DISCHARGE INSTR - COC
Continuity of Care Form    Patient Name: Pop Carmona   :  1985  MRN:  [id number]    Admit date:  2022  Discharge date:  ***    Code Status Order: Full Code   Advance Directives:      Admitting Physician:  Jessica Ramos MD  PCP: Ulisses Owens    Discharging Nurse: [facility] Care Unit/Room#: 0213/0213-01  Discharging Unit Phone Number: ***    Emergency Contact:   Extended Emergency Contact Information  Primary Emergency Contact: Rolanda [address] Phone: [phone]  Mobile Phone: [phone]  Relation: Spouse  Secondary Emergency Contact: [facility] Phone: [phone]  Relation: Parent    Past Surgical History:  Past Surgical History:   Procedure Laterality Date    HAND SURGERY      LAPAROSCOPIC APPENDECTOMY N/A 13    LAPAROSCOPIC APPENDECTOMY                 TONSILLECTOMY      WISDOM TOOTH EXTRACTION         Immunization History:   Immunization History   Administered Date(s) Administered    Influenza 2011    Influenza Vaccine, unspecified formulation 10/01/2018    Influenza, Quadv, IM, PF (6 mo and older Fluzone, Flulaval, Fluarix, and 3 yrs and older Afluria) 2017    Tdap (Boostrix, Adacel) 04/10/2012, 2020       Active Problems:  Patient Active Problem List   Diagnosis Code    Cold sore B00.1    Tobacco dependence F17.200    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    Morbid obesity with BMI of 45.0-49.9, adult ([facility] [address] E66.01, Z68.42    Snoring R06.83    Fatty liver K76.0    Impaired fasting glucose R73.01    Vitamin D deficiency E55.9    Mixed hyperlipidemia E78.2    Prediabetes R73.03    JHON on CPAP G47.33, Z99.89    Back pain M54.9       Isolation/Infection:   Isolation            No Isolation          Patient Infection Status       None to display            Nurse Assessment:  Last Vital Signs: /67   Pulse 87   Temp 98.2 °F (36.8 °C) (Oral)   Resp 16   Ht 6' 1\" (1.854 m)   Wt 215 lb (97.5 kg)   SpO2 95%   BMI 28.37 kg/m²     Last documented pain score (0-10 scale): Pain Level: 4  Last Weight:   Wt Readings from Last 1 Encounters:   22 215 lb (97.5 kg)     Mental Status:  {IP PT MENTAL STATUS:}    IV Access:  { ZHEN IV ACCESS:045084556}    Nursing Mobility/ADLs:  Walking   {P DME PRHP:211182643}  Transfer  {P DME NTRS:232232842}  Bathing  {P DME ZOU}  Dressing  {CHP DME GKYT:448257959}  Toileting  {[facility] DME BHVO:124499455}  Feeding  {[facility] DME XLBN:797648283}  Med Admin  {P DME BCQJ:774287523}  Med Delivery   { ZHEN MED Delivery:207782760}    Wound Care Documentation and Therapy:  Incision 13 Abdomen (Active)   Number of days: 2972        Elimination:  Continence: Bowel: {YES / PY:22733}  Bladder: {YES / V}  Urinary Catheter: {Urinary Catheter:627058503}   Colostomy/Ileostomy/Ileal Conduit: {YES / TD:46112}       Date of Last BM: ***    Intake/Output Summary (Last 24 hours) at 2022 1240  Last data filed at 1/15/2022 2013  Gross per 24 hour   Intake 480 ml   Output 0 ml   Net 480 ml     I/O last 3 completed shifts:   In: 12 [P.O.:960]  Out: 600 [Urine:600]    Safety Concerns:     [address] Safety Concerns:224268109}    Impairments/Disabilities:      [address] Impairments/Disabilities:006701946}    Nutrition Therapy:  Current Nutrition Therapy:   [address] [employer] Diet List:767547628}    Routes of Feeding: {[facility] DME Other Feedings:873663224}  Liquids: {Slp liquid thickness:84639}  Daily Fluid Restriction: {CHP DME Yes amt example:155965837}  Last Modified Barium Swallow with Video (Video Swallowing Test): {Done Not Done ZHFI:105087022}    Treatments at the Time of Hospital Discharge:   Respiratory Treatments: ***  Oxygen Therapy:  {Therapy; copd oxygen:79917}  Ventilator:    {MH CC Vent CRQT:649780363}    Rehab Therapies: {THERAPEUTIC INTERVENTION:9882476948}  Weight Bearing Status/Restrictions: {LEÓN GRIFFIN Weight Bearin}  Other Medical Equipment (for information only, NOT a DME order):  {EQUIPMENT:398621002}  Other Treatments: ***    Patient's personal belongings (please select all that are sent with patient):  {CHP DME Belongings:272978855}    RN SIGNATURE:  {Esignature:062584671}    CASE MANAGEMENT/SOCIAL WORK SECTION    Inpatient Status Date: ***    Readmission Risk Assessment Score:  Readmission Risk              Risk of Unplanned Readmission:  0           Discharging to Facility/ Agency   Name:   Address:  Phone:  Fax:    Dialysis Facility (if applicable)   Name:  Address:  Dialysis Schedule:  Phone:  Fax:    / signature: {Esignature:841904045}    PHYSICIAN SECTION    Prognosis: Good    Condition at Discharge: Stable    Rehab Potential (if transferring to Rehab): Good    Recommended Labs or Other Treatments After Discharge: Follow-up with PCP in 1-2 weeks. Follow-up with neurosurgery as soon as possible    Physician Certification: I certify the above information and transfer of Sofiya Hamilton  is necessary for the continuing treatment of the diagnosis listed and that he requires [address] for less 30 days.      Update Admission H&P: No change in H&P    PHYSICIAN SIGNATURE:  Electronically signed by ROCÍO Wilkerson on 1/16/22 at 1:09 PM EST

## 2022-01-16 NOTE — PROGRESS NOTES
Report given to [employer]. Call light and bedside table within reach. No needs voiced at this time. Bed in lowest position, brakes locked.

## 2022-01-17 NOTE — DISCHARGE SUMMARY
Hospital Medicine Discharge Summary    Patient ID: Giacomo Almanza      Patient's PCP: Lenka Degroot Date: 1/14/2022     Discharge Date: 1/16/2022      Admitting Provider: Angeles Alexander MD     Discharge Provider: ROCÍO Claire     Discharge Diagnoses: Active Hospital Problems    Diagnosis     Back pain [M54.9]        The patient was seen and examined on day of discharge and this discharge summary is in conjunction with any daily progress note from day of discharge. Hospital Course:   39 y.o. male who presented to [facility] with with a history of depression, hypertension with complaints of back pain after a work injury. Patient states that he was helping lift a crate that was 200 to 400 pounds off a truck when he felt like his back gave out and he had immediate low back pain. He states he is able to return home and tried to bear the pain but reports the pain worsened at home and he noticed his ability to walk decreased. He states the pain does radiate down both of his legs. He describes the pain as sharp and feels like a knife is twisting when he moves. He rates his pain as a 7 out of 10 after receiving pain medicine but a 10 out of 10 upon presentation. He denies saddle anesthesia, loss of bowel or bladder control, shortness of breath, dysuria, lightheaded or dizziness, abdominal pain, N/V/D, or weakness in his arms or legs. Right L4 nerve root impingement  Disc bulge at L4-L5 with effacement on the L5 nerve root  - Orthopedic spine, consulted appreciate recs, Patient seen by Dr. Haris Medina, will follow-up next week for possible epidural injections.   - Pain control with lidocaine patches, Robaxin, Ibuprofen, Norco, Gabapentin  - Up with assistance  - Home OT/PT ordered    Physical Exam Performed:     /67   Pulse 87   Temp 98.2 °F (36.8 °C) (Oral)   Resp 16   Ht 6' 1\" (1.854 m)   Wt 215 lb (97.5 kg)   SpO2 95%   BMI 28.37 kg/m²       General appearance:  No apparent distress, appears stated age and cooperative. HEENT:  Normal cephalic, atraumatic without obvious deformity. Pupils equal, round, and reactive to light. Extra ocular muscles intact. Conjunctivae/corneas clear. Neck: Supple, with full range of motion. No jugular venous distention. Trachea midline. Respiratory:  Normal respiratory effort. Clear to auscultation, bilaterally without Rales/Wheezes/Rhonchi. Cardiovascular:  Regular rate and rhythm with normal S1/S2 without murmurs, rubs or gallops. Abdomen: Soft, non-tender, non-distended with normal bowel sounds. Musculoskeletal: Decreased strength in bilateral lower extremities, secondary to pain, decreased PROM and AROM, sensation intact no clubbing, cyanosis or edema bilaterally. Full range of motion without deformity. Skin: Skin color, texture, turgor normal.  No rashes or lesions. Neurologic:  Neurovascularly intact without any focal sensory/motor deficits. Cranial nerves:  Psychiatric:  Alert and oriented, thought content appropriate, normal insight  Capillary Refill: Brisk,< 3 seconds   Peripheral Pulses: +2 palpable, equal bilaterally       Labs: For convenience and continuity at follow-up the following most recent labs are provided:      CBC:    Lab Results   Component Value Date    WBC 6.4 01/14/2022    HGB 14.6 01/14/2022    HCT 42.7 01/14/2022     01/14/2022       Renal:    Lab Results   Component Value Date     01/14/2022    K 4.1 01/14/2022     01/14/2022    CO2 29 01/14/2022    BUN 21 01/14/2022    CREATININE 0.8 01/14/2022    CALCIUM 8.9 01/14/2022         Significant Diagnostic Studies    Radiology:   MRI LUMBAR SPINE WO CONTRAST   Final Result   1. Moderate spinal canal stenosis at L3-L4 with mild stenosis at L4-L5.    2. There may be impingement upon the right L4 nerve root at L3-L4 as well as   impingement of the right S1 nerve root at L5-S1.   3. A disc bulge at L4-L5 contacts the L5 nerve roots bilaterally. 4. Multilevel neural foraminal narrowing as above. 5. Minimal retrolisthesis at L5-S1. Consults:     IP CONSULT TO ORTHOPEDIC SURGERY  IP CONSULT TO HOSPITALIST  IP CONSULT TO ORTHOPEDIC SURGERY  IP CONSULT TO SOCIAL WORK  IP CONSULT TO HOME CARE NEEDS    Disposition:  Home with Alex e OT/PT    Condition at Discharge: Stable    Discharge Instructions/Follow-up:    Follow-up with PCP in 1-2 weeks  Follow-up with Neurosurgery early next week    Code Status:  Prior FULL    Activity: activity as tolerated    Diet: Regular       Discharge Medications:     Discharge Medication List as of 1/16/2022  1:33 PM           Details   HYDROcodone-acetaminophen (NORCO) 5-325 MG per tablet Take 1 tablet by mouth every 6 hours as needed for Pain for up to 3 days. , Disp-12 tablet, R-0, DAWNormal      ibuprofen (ADVIL;MOTRIN) 800 MG tablet Take 1 tablet by mouth every 6 hours as needed for Pain, Disp-120 tablet, R-1Normal      gabapentin (NEURONTIN) 300 MG capsule Take 1 capsule by mouth 3 times daily for 7 days. , Disp-21 capsule, R-0, DAWNormal      polyethylene glycol (GLYCOLAX) 17 g packet Take 17 g by mouth daily as needed for Constipation, Disp-527 g, R-1Normal      senna (SENOKOT) 8.6 MG tablet Take 1 tablet by mouth 2 times daily, Disp-60 tablet, R-11Normal      nicotine (NICODERM CQ) 21 MG/24HR Place 1 patch onto the skin daily, Disp-30 patch, R-3Normal      methocarbamol (ROBAXIN) 500 MG tablet Take 2 tablets by mouth 3 times daily for 21 days, Disp-126 tablet, R-0, DAWNormal      dexamethasone (DECADRON) 4 MG tablet Take 1 tablet by mouth every 4 hours for 5 days, Disp-30 tablet, R-0Normal              Details   vitamin D (ERGOCALCIFEROL) 05925 units CAPS capsule Take 1 capsule by mouth once a week for 12 doses, Disp-12 capsule, R-0Normal      varenicline (CHANTIX CONTINUING MONTH DEB) 1 MG tablet Take 1 tablet by mouth 2 times daily, Disp-60 tablet, R-1Normal             Time Spent on discharge

## 2022-09-03 ENCOUNTER — HOSPITAL ENCOUNTER (EMERGENCY)
Age: 37
Discharge: HOME | End: 2022-09-04
Payer: COMMERCIAL

## 2022-09-03 ENCOUNTER — APPOINTMENT (OUTPATIENT)
Dept: GENERAL RADIOLOGY | Age: 37
End: 2022-09-03
Payer: COMMERCIAL

## 2022-09-03 VITALS
TEMPERATURE: 98.1 F | HEIGHT: 73 IN | OXYGEN SATURATION: 99 % | SYSTOLIC BLOOD PRESSURE: 137 MMHG | RESPIRATION RATE: 18 BRPM | DIASTOLIC BLOOD PRESSURE: 87 MMHG | WEIGHT: 225 LBS | HEART RATE: 95 BPM | BODY MASS INDEX: 29.82 KG/M2

## 2022-09-03 DIAGNOSIS — S61.412A LACERATION OF LEFT HAND WITHOUT FOREIGN BODY, INITIAL ENCOUNTER: Primary | ICD-10-CM

## 2022-09-03 PROCEDURE — 99283 EMERGENCY DEPT VISIT LOW MDM: CPT

## 2022-09-03 PROCEDURE — 73110 X-RAY EXAM OF WRIST: CPT

## 2022-09-03 PROCEDURE — 12001 RPR S/N/AX/GEN/TRNK 2.5CM/<: CPT

## 2022-09-03 RX ORDER — DEXTROAMPHETAMINE SACCHARATE, AMPHETAMINE ASPARTATE, DEXTROAMPHETAMINE SULFATE AND AMPHETAMINE SULFATE 2.5; 2.5; 2.5; 2.5 MG/1; MG/1; MG/1; MG/1
10 TABLET ORAL 2 TIMES DAILY
COMMUNITY
Start: 2022-08-07 | End: 2022-09-06

## 2022-09-03 ASSESSMENT — LIFESTYLE VARIABLES: HOW OFTEN DO YOU HAVE A DRINK CONTAINING ALCOHOL: NEVER

## 2022-09-04 PROCEDURE — 6370000000 HC RX 637 (ALT 250 FOR IP): Performed by: PHYSICIAN ASSISTANT

## 2022-09-04 RX ORDER — SULFAMETHOXAZOLE AND TRIMETHOPRIM 800; 160 MG/1; MG/1
1 TABLET ORAL 2 TIMES DAILY
Qty: 20 TABLET | Refills: 0 | Status: SHIPPED | OUTPATIENT
Start: 2022-09-04 | End: 2022-09-14

## 2022-09-04 RX ORDER — SULFAMETHOXAZOLE AND TRIMETHOPRIM 800; 160 MG/1; MG/1
1 TABLET ORAL ONCE
Status: COMPLETED | OUTPATIENT
Start: 2022-09-04 | End: 2022-09-04

## 2022-09-04 RX ORDER — SULFAMETHOXAZOLE AND TRIMETHOPRIM 800; 160 MG/1; MG/1
1 TABLET ORAL 2 TIMES DAILY
Qty: 20 TABLET | Refills: 0 | Status: SHIPPED | OUTPATIENT
Start: 2022-09-04 | End: 2022-09-04

## 2022-09-04 RX ADMIN — SULFAMETHOXAZOLE AND TRIMETHOPRIM 1 TABLET: 800; 160 TABLET ORAL at 01:35
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
## 2022-09-04 NOTE — ED NOTES
Patient left via personal vehicle to private residence in stable condition. Patients vitals were assessed before discharge and were stable. Patient verbalized understanding of discharge instructions, follow up and medications. RN explained information in discharge packet and patient verbalized they have no questions at this time. Patient left ER with all paperwork and belongings that RN is aware of.         Frantz Holguin RN  09/04/22 5926

## 2022-09-05 ENCOUNTER — HOSPITAL ENCOUNTER (EMERGENCY)
Age: 37
Discharge: HOME | End: 2022-09-05
Payer: COMMERCIAL

## 2022-09-05 VITALS
TEMPERATURE: 98.4 F | HEIGHT: 73 IN | RESPIRATION RATE: 16 BRPM | DIASTOLIC BLOOD PRESSURE: 92 MMHG | OXYGEN SATURATION: 100 % | SYSTOLIC BLOOD PRESSURE: 159 MMHG | WEIGHT: 225 LBS | HEART RATE: 85 BPM | BODY MASS INDEX: 29.82 KG/M2

## 2022-09-05 DIAGNOSIS — Z51.89 VISIT FOR WOUND CHECK: Primary | ICD-10-CM

## 2022-09-05 PROCEDURE — 99281 EMR DPT VST MAYX REQ PHY/QHP: CPT

## 2022-09-05 ASSESSMENT — PAIN - FUNCTIONAL ASSESSMENT: PAIN_FUNCTIONAL_ASSESSMENT: 0-10

## 2022-09-05 ASSESSMENT — PAIN SCALES - GENERAL: PAINLEVEL_OUTOF10: 1

## 2022-09-06 NOTE — ED PROVIDER NOTES
[address] [place] [address]  ED  EMERGENCY DEPARTMENT ENCOUNTER      This patient was not seen and evaluated by the attending physician. Pt Name: Bin Gordon  MRN: [id number]  [place] 1985  Date of evaluation: 9/5/2022  Provider: SILVIA Gilliam - CNP-C  PCP: Anna Ladd      History provided by the patient. CHIEFCOMPLAINT:     Chief Complaint   Patient presents with    Other     Pt arrived with hand swelling after stitches on Friday, was told by friend who is a doctor that he should come in and be admitted for IV ABX. Was given Rx for bactrium but has not taken it until today        HISTORY OF PRESENT ILLNESS:      Bin Gordon is a 40 y.o. male who presents to [address] [place] [address]  ED with complaints of concern for infection. Patient was here a few days ago where he received sutures for a laceration to his left wrist.  He states that today he noticed very acute swelling and he had a friend who is a physician look at it and was concerned that the patient required IV antibiotics. Patient has had no fever, no new injuries. He is here for further evaluation. LOCATION:-  QUALITY:-  SEVERITY:-  DURATION:-  MODIFYING FACTORS:-    Nursing Notes were reviewed     REVIEW OF SYSTEMS:     Review of Systems  All systems, a total of 10, are reviewed and negative except for those that were just noted in history present illness.         PAST MEDICAL HISTORY:     Past Medical History:   Diagnosis Date    Depression     Hypertension     Sleep apnea     no sleep studies         SURGICAL HISTORY:      Past Surgical History:   Procedure Laterality Date    HAND SURGERY      LAPAROSCOPIC APPENDECTOMY N/A 11-26-13    LAPAROSCOPIC APPENDECTOMY                 TONSILLECTOMY      WISDOM TOOTH EXTRACTION           CURRENT MEDICATIONS:       Discharge Medication List as of 9/5/2022 10:22 PM        CONTINUE these medications which have NOT CHANGED    Details   sulfamethoxazole-trimethoprim (BACTRIM DS;SEPTRA DS) 800-160 MG per tablet Take 1 tablet by mouth 2 times daily for 10 days, Disp-20 tablet, R-0Print      amphetamine-dextroamphetamine (ADDERALL) 10 MG tablet Take 10 mg by mouth 2 times daily. Historical Med      ibuprofen (ADVIL;MOTRIN) 800 MG tablet Take 1 tablet by mouth every 6 hours as needed for Pain, Disp-120 tablet, R-1Normal      gabapentin (NEURONTIN) 300 MG capsule Take 1 capsule by mouth 3 times daily for 7 days. , Disp-21 capsule, R-0, DAWNormal      senna (SENOKOT) 8.6 MG tablet Take 1 tablet by mouth 2 times daily, Disp-60 tablet, R-11Normal      nicotine (NICODERM CQ) 21 MG/24HR Place 1 patch onto the skin daily, Disp-30 patch, R-3Normal      vitamin D (ERGOCALCIFEROL) 77624 units CAPS capsule Take 1 capsule by mouth once a week for 12 doses, Disp-12 capsule, R-0Normal      varenicline (CHANTIX CONTINUING MONTH DEB) 1 MG tablet Take 1 tablet by mouth 2 times daily, Disp-60 tablet, R-1Normal               ALLERGIES:    Patient has no known allergies.     FAMILY HISTORY:       Family History   Problem Relation Age of Onset    Diabetes Mother     High Blood Pressure Mother     High Cholesterol Mother     Diabetes Father     High Blood Pressure Father     High Cholesterol Father     Heart Disease Brother         aortic stenosis    Cancer Maternal Grandmother     Cancer Maternal Grandfather     Cancer Paternal Grandmother     Cancer Paternal Grandfather           SOCIAL HISTORY:     Social History     Socioeconomic History    Marital status:    Tobacco Use    Smoking status: Every Day     Packs/day: 0.50     Years: 11.00     Pack years: 5.50     Types: Cigarettes     Last attempt to quit: 12/15/2016     Years since quittin.7    Smokeless tobacco: Never    Tobacco comments:     on chantix, trying to quit- 19   Vaping Use    Vaping Use: Never used   Substance and Sexual Activity    Alcohol use: Yes     Comment: occasional    Drug use: No    Sexual activity: Yes Partners: Female       SCREENINGS:    Janette Coma Scale  Eye Opening: Spontaneous  Best Verbal Response: Oriented  Best Motor Response: Obeys commands  Janette Coma Scale Score: 15        PHYSICAL EXAM:       ED Triage Vitals   BP Temp Temp Source Heart Rate Resp SpO2 Height Weight   09/05/22 2142 09/05/22 2141 09/05/22 2141 09/05/22 2142 09/05/22 2142 09/05/22 2142 09/05/22 2141 09/05/22 2141   (!) 159/92 98.4 °F (36.9 °C) Oral 85 16 100 % 6' 1\" (1.854 m) 225 lb (102.1 kg)       Physical Exam    CONSTITUTIONAL: Awake and alert. Cooperative. Well-developed. Well-nourished. Non-toxic. No acute distress. Vitals:    09/05/22 2141 09/05/22 2142   BP:  (!) 159/92   Pulse:  85   Resp:  16   Temp: 98.4 °F (36.9 °C)    TempSrc: Oral    SpO2:  100%   Weight: 225 lb (102.1 kg)    Height: 6' 1\" (1.854 m)      HENT: Normocephalic. Atraumatic. External ears normal, without discharge. Nonasal discharge. Mucous membranes moist.  EYES: Conjunctiva non-injected, no lid abnormalities noted. No scleral icterus. EOM's grossly intact. Anterior chambers clear. NECK: Supple. Normal ROM. No meningismus. CARDIOVASCULAR: no tachycardia per vital signs. PULMONARY/CHEST WALL: Effort normal. No tachypnea. No audible adventitious breath sounds. ABDOMEN: No obvious abdominal distention, no obvious hernias. Back: Spine is midline. No obvious trauma or outward signs of cauda equina  /ANORECTAL: Not assessed  MUSKULOSKELETAL: Normal ROM. No acute deformities. Minimal swelling to the left hand. 2+ radial pulse. SKIN: Warm and dry. Small repaired laceration noted to the left wrist with no significant surrounding erythema. NEUROLOGICAL:  GCS 15. No obvious focal neurological deficits. PSYCHIATRIC: Normal affect, normal insight and judgement. Alert and oriented x 3. DIAGNOSTIC RESULTS:     LABS:    No results found for this visit on 09/05/22. RADIOLOGY:  All x-ray studies are viewed/reviewed by me.   Formal interpretations

## 2022-09-06 NOTE — ED NOTES
Discharge instructions explained by ED provider. Patient verbalized understanding and denies any other concerns or complaints at this time. Patient vital signs stable and no acute signs or symptoms of distress noted at discharge. Patient deemed clinically stable. Patient d/c home.      Odalys Godfrey RN  09/05/22 [id number]

## 2024-01-03 ENCOUNTER — HOSPITAL ENCOUNTER (EMERGENCY)
Age: 39
Discharge: HOME | End: 2024-01-03
Attending: EMERGENCY MEDICINE
Payer: COMMERCIAL

## 2024-01-03 ENCOUNTER — APPOINTMENT (OUTPATIENT)
Dept: CT IMAGING | Age: 39
End: 2024-01-03
Payer: COMMERCIAL

## 2024-01-03 VITALS
RESPIRATION RATE: 18 BRPM | HEART RATE: 93 BPM | OXYGEN SATURATION: 95 % | SYSTOLIC BLOOD PRESSURE: 135 MMHG | DIASTOLIC BLOOD PRESSURE: 84 MMHG | TEMPERATURE: 98.6 F

## 2024-01-03 DIAGNOSIS — G89.29 ACUTE EXACERBATION OF CHRONIC LOW BACK PAIN: Primary | ICD-10-CM

## 2024-01-03 DIAGNOSIS — M51.36 BULGING LUMBAR DISC: ICD-10-CM

## 2024-01-03 DIAGNOSIS — M54.50 ACUTE EXACERBATION OF CHRONIC LOW BACK PAIN: Primary | ICD-10-CM

## 2024-01-03 DIAGNOSIS — M51.27 HERNIATED NUCLEUS PULPOSUS, L5-S1: ICD-10-CM

## 2024-01-03 DIAGNOSIS — M51.36 L4-L5 DISC BULGE: ICD-10-CM

## 2024-01-03 PROCEDURE — 99284 EMERGENCY DEPT VISIT MOD MDM: CPT

## 2024-01-03 PROCEDURE — 6360000002 HC RX W HCPCS: Performed by: EMERGENCY MEDICINE

## 2024-01-03 PROCEDURE — 72131 CT LUMBAR SPINE W/O DYE: CPT

## 2024-01-03 PROCEDURE — 96372 THER/PROPH/DIAG INJ SC/IM: CPT

## 2024-01-03 PROCEDURE — 6370000000 HC RX 637 (ALT 250 FOR IP): Performed by: EMERGENCY MEDICINE

## 2024-01-03 PROCEDURE — 6370000000 HC RX 637 (ALT 250 FOR IP): Performed by: PHYSICIAN ASSISTANT

## 2024-01-03 RX ORDER — OXYCODONE HYDROCHLORIDE AND ACETAMINOPHEN 5; 325 MG/1; MG/1
1 TABLET ORAL EVERY 6 HOURS PRN
Qty: 12 TABLET | Refills: 0 | Status: SHIPPED | OUTPATIENT
Start: 2024-01-03 | End: 2024-01-06

## 2024-01-03 RX ORDER — KETOROLAC TROMETHAMINE 30 MG/ML
30 INJECTION, SOLUTION INTRAMUSCULAR; INTRAVENOUS ONCE
Status: COMPLETED | OUTPATIENT
Start: 2024-01-03 | End: 2024-01-03

## 2024-01-03 RX ORDER — METHOCARBAMOL 750 MG/1
750 TABLET, FILM COATED ORAL 4 TIMES DAILY
Qty: 60 TABLET | Refills: 0 | Status: SHIPPED | OUTPATIENT
Start: 2024-01-03 | End: 2024-01-18

## 2024-01-03 RX ORDER — LIDOCAINE 50 MG/G
1 PATCH TOPICAL DAILY
Qty: 10 PATCH | Refills: 0 | Status: SHIPPED | OUTPATIENT
Start: 2024-01-03 | End: 2024-01-13

## 2024-01-03 RX ORDER — OXYCODONE HYDROCHLORIDE AND ACETAMINOPHEN 5; 325 MG/1; MG/1
1 TABLET ORAL ONCE
Status: COMPLETED | OUTPATIENT
Start: 2024-01-03 | End: 2024-01-03

## 2024-01-03 RX ORDER — PREDNISONE 10 MG/1
TABLET ORAL
Qty: 20 TABLET | Refills: 0 | Status: SHIPPED | OUTPATIENT
Start: 2024-01-03 | End: 2024-01-10

## 2024-01-03 RX ORDER — LIDOCAINE 4 G/G
1 PATCH TOPICAL DAILY
Status: DISCONTINUED | OUTPATIENT
Start: 2024-01-03 | End: 2024-01-03 | Stop reason: HOSPADM

## 2024-01-03 RX ORDER — GABAPENTIN 100 MG/1
100 CAPSULE ORAL 2 TIMES DAILY
Qty: 60 CAPSULE | Refills: 0 | Status: SHIPPED | OUTPATIENT
Start: 2024-01-03 | End: 2024-02-02

## 2024-01-03 RX ORDER — KETOROLAC TROMETHAMINE 10 MG/1
10 TABLET, FILM COATED ORAL EVERY 6 HOURS PRN
Qty: 20 TABLET | Refills: 0 | Status: SHIPPED | OUTPATIENT
Start: 2024-01-03 | End: 2024-01-08

## 2024-01-03 RX ORDER — OXYCODONE HYDROCHLORIDE 5 MG/1
10 TABLET ORAL ONCE
Status: COMPLETED | OUTPATIENT
Start: 2024-01-03 | End: 2024-01-03

## 2024-01-03 RX ORDER — DEXAMETHASONE SODIUM PHOSPHATE 4 MG/ML
8 INJECTION, SOLUTION INTRA-ARTICULAR; INTRALESIONAL; INTRAMUSCULAR; INTRAVENOUS; SOFT TISSUE ONCE
Status: COMPLETED | OUTPATIENT
Start: 2024-01-03 | End: 2024-01-03

## 2024-01-03 RX ADMIN — KETOROLAC TROMETHAMINE 30 MG: 30 INJECTION, SOLUTION INTRAMUSCULAR; INTRAVENOUS at 10:58

## 2024-01-03 RX ADMIN — OXYCODONE AND ACETAMINOPHEN 1 TABLET: 5; 325 TABLET ORAL at 14:04

## 2024-01-03 RX ADMIN — OXYCODONE HYDROCHLORIDE 10 MG: 5 TABLET ORAL at 10:58

## 2024-01-03 RX ADMIN — DEXAMETHASONE SODIUM PHOSPHATE 8 MG: 4 INJECTION, SOLUTION INTRAMUSCULAR; INTRAVENOUS at 10:59

## 2024-01-03 ASSESSMENT — PAIN DESCRIPTION - LOCATION: LOCATION: BACK

## 2024-01-03 ASSESSMENT — ENCOUNTER SYMPTOMS
VOMITING: 0
NAUSEA: 0
BACK PAIN: 1
COUGH: 0
SHORTNESS OF BREATH: 0

## 2024-01-03 ASSESSMENT — PAIN SCALES - GENERAL
PAINLEVEL_OUTOF10: 5
PAINLEVEL_OUTOF10: 3
PAINLEVEL_OUTOF10: 3

## 2024-01-03 ASSESSMENT — PAIN - FUNCTIONAL ASSESSMENT: PAIN_FUNCTIONAL_ASSESSMENT: 0-10

## 2024-01-03 NOTE — ED NOTES
Patient left via personal vehicle to private residence in stable condition. Patients vitals were assessed before discharge and were stable. Patient verbalized understanding of discharge instructions, follow up and medications. RN explained information in discharge packet and patient verbalized they have no questions at this time. Patient left ER with all paperwork and belongings that RN is aware of.

## 2024-01-05 NOTE — ED PROVIDER NOTES
[facility] Emergency Department    CHIEF COMPLAINT  Back Pain (January of 2022 injured back at work, over the weekend bent over to pick son up and felt back \"pop\", has progressively been getting worse, has been taking robaxin, percocet, vicodin, and gabapentin, follows with stefano)      SHARED SERVICE VISIT  I have seen and evaluated this patient with my supervising physician, Dr. Savanna Pepper.    HISTORY OF PRESENT ILLNESS  Gagandeep Evans is a 38 y.o. male who presents to the ED complaining of low back pain.  He does have a significant past medical history of known lumbar disc disease and other  comorbidities, presenting today complaining of acute on chronic low back pain.  Says pain began 3 days ago when he was trying to lift his son across the bed.  He says he felt a pop.  He says since then he has been experiencing back pain that is worsened with motion.  He has noticed when he lays down he feels a little bit better.  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  He states that he has been taking multiple medications to include Vicodin, Percocet, Robaxin, however the pain still persist.  He does have a follow-up appointment with [facility] scheduled on Monday, however feels pain is severe to severe that he needs admission at this point.  He actually suffered a back injury approximately 2 years ago and has been trying to delay surgeries at this point.  Currently denies any numbness, weakness or tingling but does state that he feels that his extremities might feel if they are going to sleep.  He also is experiencing some left leg pain that does wrap around the spiral around to his anterior thigh.  He has been able to ambulate throughout this emergency department and denies any weakness or numbness.  He is currently asking about an MRI here in the 
reevaluation, review of records, and consultation.  There was a high probability of clinically significant life-threatening deterioration in the patient's condition, which required my urgent intervention.     _____________________________________    DISCHARGE MEDICATIONS (if applicable):  New Prescriptions    KETOROLAC (TORADOL) 10 MG TABLET    Take 1 tablet by mouth every 6 hours as needed for Pain    LIDOCAINE (LIDODERM) 5 %    Place 1 patch onto the skin daily for 10 days 12 hours on, 12 hours off.    METHOCARBAMOL (ROBAXIN-750) 750 MG TABLET    Take 1 tablet by mouth 4 times daily for 15 days    PREDNISONE (DELTASONE) 10 MG TABLET    Take 4 tablets by mouth daily for 2 days, THEN 3 tablets daily for 2 days, THEN 2 tablets daily for 2 days, THEN 1 tablet daily for 2 days.       DISCONTINUED MEDICATIONS:  Discontinued Medications    IBUPROFEN (ADVIL;MOTRIN) 800 MG TABLET    Take 1 tablet by mouth every 6 hours as needed for Pain            DISPOSITION REFERRAL (if applicable):  [facility]  ED  [address]  [facility] [address] [phone]    If symptoms worsen, As needed    Adriano Ashby  [id number]  [facility] [address] [phone]    Schedule an appointment as soon as possible for a visit       Omid Forte MD  [address]  [facility] [address] [phone]    Schedule an appointment as soon as possible for a visit         _____________________________________      Savanna TRUJILLO DO, ([facility]) am the primary attending of record and contributed the majority of evaluation and treatment of emergent care for this encounter.     This chart was generated in part by using Dragon Dictation system and may contain errors related to that system including errors in grammar, punctuation, and spelling, as well as words and phrases that may be inappropriate. If there are any questions or concerns please feel free to contact the